# Patient Record
Sex: MALE | Race: OTHER | HISPANIC OR LATINO | URBAN - METROPOLITAN AREA
[De-identification: names, ages, dates, MRNs, and addresses within clinical notes are randomized per-mention and may not be internally consistent; named-entity substitution may affect disease eponyms.]

---

## 2017-04-16 ENCOUNTER — INPATIENT (INPATIENT)
Facility: HOSPITAL | Age: 22
LOS: 1 days | Discharge: HOME | DRG: 917 | End: 2017-04-18
Attending: HOSPITALIST | Admitting: HOSPITALIST
Payer: MEDICAID

## 2017-04-16 VITALS
HEIGHT: 67 IN | HEART RATE: 105 BPM | WEIGHT: 149.91 LBS | DIASTOLIC BLOOD PRESSURE: 78 MMHG | TEMPERATURE: 98 F | OXYGEN SATURATION: 96 % | SYSTOLIC BLOOD PRESSURE: 132 MMHG | RESPIRATION RATE: 18 BRPM

## 2017-04-16 LAB
ALBUMIN SERPL ELPH-MCNC: 4.2 G/DL — SIGNIFICANT CHANGE UP (ref 3.3–5.2)
ALP SERPL-CCNC: 67 U/L — SIGNIFICANT CHANGE UP (ref 40–120)
ALT FLD-CCNC: 36 U/L — SIGNIFICANT CHANGE UP
AMPHET UR-MCNC: NEGATIVE — SIGNIFICANT CHANGE UP
ANION GAP SERPL CALC-SCNC: 22 MMOL/L — HIGH (ref 5–17)
APAP SERPL-MCNC: <7.5 UG/ML — LOW (ref 10–26)
APPEARANCE UR: CLEAR — SIGNIFICANT CHANGE UP
AST SERPL-CCNC: 44 U/L — HIGH
BACTERIA # UR AUTO: ABNORMAL
BARBITURATES UR SCN-MCNC: NEGATIVE — SIGNIFICANT CHANGE UP
BASOPHILS # BLD AUTO: 0 K/UL — SIGNIFICANT CHANGE UP (ref 0–0.2)
BASOPHILS NFR BLD AUTO: 0.2 % — SIGNIFICANT CHANGE UP (ref 0–2)
BENZODIAZ UR-MCNC: POSITIVE
BILIRUB SERPL-MCNC: 0.4 MG/DL — SIGNIFICANT CHANGE UP (ref 0.4–2)
BILIRUB UR-MCNC: NEGATIVE — SIGNIFICANT CHANGE UP
BUN SERPL-MCNC: 10 MG/DL — SIGNIFICANT CHANGE UP (ref 8–20)
CALCIUM SERPL-MCNC: 8.7 MG/DL — SIGNIFICANT CHANGE UP (ref 8.6–10.2)
CHLORIDE SERPL-SCNC: 97 MMOL/L — LOW (ref 98–107)
CO2 SERPL-SCNC: 19 MMOL/L — LOW (ref 22–29)
COCAINE METAB.OTHER UR-MCNC: NEGATIVE — SIGNIFICANT CHANGE UP
COLOR SPEC: YELLOW — SIGNIFICANT CHANGE UP
CREAT SERPL-MCNC: 1.05 MG/DL — SIGNIFICANT CHANGE UP (ref 0.5–1.3)
DIFF PNL FLD: NEGATIVE — SIGNIFICANT CHANGE UP
EOSINOPHIL # BLD AUTO: 0.1 K/UL — SIGNIFICANT CHANGE UP (ref 0–0.5)
EOSINOPHIL NFR BLD AUTO: 1.2 % — SIGNIFICANT CHANGE UP (ref 0–5)
EPI CELLS # UR: SIGNIFICANT CHANGE UP
ETHANOL SERPL-MCNC: <10 MG/DL — SIGNIFICANT CHANGE UP
GAS PNL BLDA: SIGNIFICANT CHANGE UP
GAS PNL BLDA: SIGNIFICANT CHANGE UP
GLUCOSE SERPL-MCNC: 279 MG/DL — HIGH (ref 70–115)
GLUCOSE UR QL: 100 MG/DL
HCT VFR BLD CALC: 45.4 % — SIGNIFICANT CHANGE UP (ref 42–52)
HGB BLD-MCNC: 15.1 G/DL — SIGNIFICANT CHANGE UP (ref 14–18)
KETONES UR-MCNC: NEGATIVE — SIGNIFICANT CHANGE UP
LACTATE BLDV-MCNC: 1.5 MMOL/L — SIGNIFICANT CHANGE UP (ref 0.7–2)
LEUKOCYTE ESTERASE UR-ACNC: NEGATIVE — SIGNIFICANT CHANGE UP
LYMPHOCYTES # BLD AUTO: 3.6 K/UL — SIGNIFICANT CHANGE UP (ref 1–4.8)
LYMPHOCYTES # BLD AUTO: 32.2 % — SIGNIFICANT CHANGE UP (ref 20–55)
MCHC RBC-ENTMCNC: 27.8 PG — SIGNIFICANT CHANGE UP (ref 27–31)
MCHC RBC-ENTMCNC: 33.3 G/DL — SIGNIFICANT CHANGE UP (ref 32–36)
MCV RBC AUTO: 83.5 FL — SIGNIFICANT CHANGE UP (ref 80–94)
METHADONE UR-MCNC: NEGATIVE — SIGNIFICANT CHANGE UP
MONOCYTES # BLD AUTO: 0.7 K/UL — SIGNIFICANT CHANGE UP (ref 0–0.8)
MONOCYTES NFR BLD AUTO: 5.9 % — SIGNIFICANT CHANGE UP (ref 3–10)
NEUTROPHILS # BLD AUTO: 6.7 K/UL — SIGNIFICANT CHANGE UP (ref 1.8–8)
NEUTROPHILS NFR BLD AUTO: 60.1 % — SIGNIFICANT CHANGE UP (ref 37–73)
NITRITE UR-MCNC: NEGATIVE — SIGNIFICANT CHANGE UP
OPIATES UR-MCNC: POSITIVE
PCP SPEC-MCNC: SIGNIFICANT CHANGE UP
PCP UR-MCNC: NEGATIVE — SIGNIFICANT CHANGE UP
PH UR: 6 — SIGNIFICANT CHANGE UP (ref 4.8–8)
PLATELET # BLD AUTO: 259 K/UL — SIGNIFICANT CHANGE UP (ref 150–400)
POTASSIUM SERPL-MCNC: 3.2 MMOL/L — LOW (ref 3.5–5.3)
POTASSIUM SERPL-SCNC: 3.2 MMOL/L — LOW (ref 3.5–5.3)
PROT SERPL-MCNC: 7.9 G/DL — SIGNIFICANT CHANGE UP (ref 6.6–8.7)
PROT UR-MCNC: 15 MG/DL
RBC # BLD: 5.44 M/UL — SIGNIFICANT CHANGE UP (ref 4.6–6.2)
RBC # FLD: 13.5 % — SIGNIFICANT CHANGE UP (ref 11–15.6)
RBC CASTS # UR COMP ASSIST: SIGNIFICANT CHANGE UP /HPF (ref 0–4)
SALICYLATES SERPL-MCNC: <2 MG/DL — LOW (ref 10–20)
SODIUM SERPL-SCNC: 138 MMOL/L — SIGNIFICANT CHANGE UP (ref 135–145)
SP GR SPEC: 1.01 — SIGNIFICANT CHANGE UP (ref 1.01–1.02)
THC UR QL: POSITIVE
UROBILINOGEN FLD QL: NEGATIVE MG/DL — SIGNIFICANT CHANGE UP
WBC # BLD: 11.1 K/UL — HIGH (ref 4.8–10.8)
WBC # FLD AUTO: 11.1 K/UL — HIGH (ref 4.8–10.8)
WBC UR QL: SIGNIFICANT CHANGE UP

## 2017-04-16 PROCEDURE — 99291 CRITICAL CARE FIRST HOUR: CPT

## 2017-04-16 PROCEDURE — 71010: CPT | Mod: 26

## 2017-04-16 RX ORDER — SODIUM CHLORIDE 9 MG/ML
1000 INJECTION INTRAMUSCULAR; INTRAVENOUS; SUBCUTANEOUS ONCE
Qty: 0 | Refills: 0 | Status: COMPLETED | OUTPATIENT
Start: 2017-04-16 | End: 2017-04-16

## 2017-04-16 RX ADMIN — SODIUM CHLORIDE 2000 MILLILITER(S): 9 INJECTION INTRAMUSCULAR; INTRAVENOUS; SUBCUTANEOUS at 19:30

## 2017-04-16 RX ADMIN — Medication 100 MILLIGRAM(S): at 23:08

## 2017-04-16 RX ADMIN — SODIUM CHLORIDE 2000 MILLILITER(S): 9 INJECTION INTRAMUSCULAR; INTRAVENOUS; SUBCUTANEOUS at 22:20

## 2017-04-16 NOTE — ED ADULT TRIAGE NOTE - CHIEF COMPLAINT QUOTE
BIBA after being found unresponsive in house, cyanotic, a RR 4/min. Was given narcan 2mg intranasal and mental and resp status returned. Pt with slurred speech and combative in triage, demanding to go home.

## 2017-04-16 NOTE — ED PROVIDER NOTE - OBJECTIVE STATEMENT
Pt. present to ED for evaluation of drug overdose. Pt. was found by EMS at home with agonal respiration and cyanotic. Pt. was given 2mg of narcan intranasal. Pt. became combative and agitated while in the ED. Pt. at first stated that he did "a couple of bags heroin". Pt. admits to drinking alcohol. Pt. keeps changing his story.

## 2017-04-16 NOTE — ED PROVIDER NOTE - PROGRESS NOTE DETAILS
Pt. re-evaluated due to tachycardia. Pt. is awake and alert and answering questions. Pt. also noted to be hypoxic on room air. Lung sounds has crackles on the left side. CXR shows possible aspiration pneumonia. Pt. placed on 50% Venti mask and now is sating at 90-91%. Blood cx and lactate and blood gas ordered. Pt. admits to doing xanax and heroin and alcohol today. Pt. is on the Venti mask 50%. Blood gas was done on 5L of NC. Pt. now sating 96% on the venti mask. Blood gas will be repeated.

## 2017-04-16 NOTE — ED ADULT NURSE NOTE - OBJECTIVE STATEMENT
as per triage note, pt found cyanotic, unresponsive with agonal breathing earlier today. responded to intranasal narcan. at this time, pt is lethargic, but awake, o x3, terrell, perrl. speech slurred. pt admits to taking "a lot of xanax", alcohol, and "a couple bags of heroin. pt story is different from what was told to dr. hill on his exam. breathing even and unlabored. lungs cta. cap refill brisk. skin w/d/i. + and = peripheral pulses. no le edema. sinus tach on cm. abdomen soft nontender nondistended. pt has no complaints at this time. will continue to monitor. as per triage note, pt found cyanotic, unresponsive with agonal breathing earlier today. responded to intranasal narcan. at this time, pt is lethargic, but awake, o x3, terrell, perrl. speech slurred. pt admits to taking "a lot of xanax", alcohol, and "a couple bags of heroin. pt denies SI, HI. "I was just trying to get high." pt story is different from what was told to dr. hill on his exam. breathing even and unlabored. lungs cta. cap refill brisk. skin w/d/i. + and = peripheral pulses. no le edema. sinus tach on cm. abdomen soft nontender nondistended. pt has no complaints at this time. will continue to monitor.

## 2017-04-17 DIAGNOSIS — F10.20 ALCOHOL DEPENDENCE, UNCOMPLICATED: ICD-10-CM

## 2017-04-17 DIAGNOSIS — F19.10 OTHER PSYCHOACTIVE SUBSTANCE ABUSE, UNCOMPLICATED: ICD-10-CM

## 2017-04-17 DIAGNOSIS — Z98.890 OTHER SPECIFIED POSTPROCEDURAL STATES: Chronic | ICD-10-CM

## 2017-04-17 DIAGNOSIS — J69.0 PNEUMONITIS DUE TO INHALATION OF FOOD AND VOMIT: ICD-10-CM

## 2017-04-17 DIAGNOSIS — F32.9 MAJOR DEPRESSIVE DISORDER, SINGLE EPISODE, UNSPECIFIED: ICD-10-CM

## 2017-04-17 DIAGNOSIS — R56.9 UNSPECIFIED CONVULSIONS: ICD-10-CM

## 2017-04-17 DIAGNOSIS — F12.10 CANNABIS ABUSE, UNCOMPLICATED: ICD-10-CM

## 2017-04-17 DIAGNOSIS — F10.10 ALCOHOL ABUSE, UNCOMPLICATED: ICD-10-CM

## 2017-04-17 DIAGNOSIS — F11.20 OPIOID DEPENDENCE, UNCOMPLICATED: ICD-10-CM

## 2017-04-17 LAB
ANION GAP SERPL CALC-SCNC: 9 MMOL/L — SIGNIFICANT CHANGE UP (ref 5–17)
BASOPHILS # BLD AUTO: 0 K/UL — SIGNIFICANT CHANGE UP (ref 0–0.2)
BASOPHILS NFR BLD AUTO: 0.1 % — SIGNIFICANT CHANGE UP (ref 0–2)
BUN SERPL-MCNC: 6 MG/DL — LOW (ref 8–20)
CALCIUM SERPL-MCNC: 8.8 MG/DL — SIGNIFICANT CHANGE UP (ref 8.6–10.2)
CHLORIDE SERPL-SCNC: 98 MMOL/L — SIGNIFICANT CHANGE UP (ref 98–107)
CO2 SERPL-SCNC: 28 MMOL/L — SIGNIFICANT CHANGE UP (ref 22–29)
CREAT SERPL-MCNC: 0.7 MG/DL — SIGNIFICANT CHANGE UP (ref 0.5–1.3)
GLUCOSE SERPL-MCNC: 108 MG/DL — SIGNIFICANT CHANGE UP (ref 70–115)
HAV IGM SER-ACNC: SIGNIFICANT CHANGE UP
HBA1C BLD-MCNC: 5.2 % — SIGNIFICANT CHANGE UP (ref 4–5.6)
HBV CORE IGM SER-ACNC: SIGNIFICANT CHANGE UP
HBV SURFACE AG SER-ACNC: SIGNIFICANT CHANGE UP
HCT VFR BLD CALC: 40.8 % — LOW (ref 42–52)
HCV AB S/CO SERPL IA: 0.12 S/CO — SIGNIFICANT CHANGE UP
HCV AB SERPL-IMP: SIGNIFICANT CHANGE UP
HGB BLD-MCNC: 13.7 G/DL — LOW (ref 14–18)
LYMPHOCYTES # BLD AUTO: 1.2 K/UL — SIGNIFICANT CHANGE UP (ref 1–4.8)
LYMPHOCYTES # BLD AUTO: 7.1 % — LOW (ref 20–55)
MAGNESIUM SERPL-MCNC: 1.4 MG/DL — LOW (ref 1.8–2.5)
MCHC RBC-ENTMCNC: 27.7 PG — SIGNIFICANT CHANGE UP (ref 27–31)
MCHC RBC-ENTMCNC: 33.6 G/DL — SIGNIFICANT CHANGE UP (ref 32–36)
MCV RBC AUTO: 82.4 FL — SIGNIFICANT CHANGE UP (ref 80–94)
MONOCYTES # BLD AUTO: 0.7 K/UL — SIGNIFICANT CHANGE UP (ref 0–0.8)
MONOCYTES NFR BLD AUTO: 4.2 % — SIGNIFICANT CHANGE UP (ref 3–10)
NEUTROPHILS # BLD AUTO: 15.2 K/UL — HIGH (ref 1.8–8)
NEUTROPHILS NFR BLD AUTO: 88.3 % — HIGH (ref 37–73)
PHOSPHATE SERPL-MCNC: 2.8 MG/DL — SIGNIFICANT CHANGE UP (ref 2.4–4.7)
PLATELET # BLD AUTO: 211 K/UL — SIGNIFICANT CHANGE UP (ref 150–400)
POTASSIUM SERPL-MCNC: 4.2 MMOL/L — SIGNIFICANT CHANGE UP (ref 3.5–5.3)
POTASSIUM SERPL-SCNC: 4.2 MMOL/L — SIGNIFICANT CHANGE UP (ref 3.5–5.3)
RBC # BLD: 4.95 M/UL — SIGNIFICANT CHANGE UP (ref 4.6–6.2)
RBC # FLD: 13.4 % — SIGNIFICANT CHANGE UP (ref 11–15.6)
SODIUM SERPL-SCNC: 135 MMOL/L — SIGNIFICANT CHANGE UP (ref 135–145)
WBC # BLD: 17.3 K/UL — HIGH (ref 4.8–10.8)
WBC # FLD AUTO: 17.3 K/UL — HIGH (ref 4.8–10.8)

## 2017-04-17 PROCEDURE — 99223 1ST HOSP IP/OBS HIGH 75: CPT

## 2017-04-17 PROCEDURE — 93010 ELECTROCARDIOGRAM REPORT: CPT

## 2017-04-17 RX ORDER — AMPICILLIN SODIUM AND SULBACTAM SODIUM 250; 125 MG/ML; MG/ML
1.5 INJECTION, POWDER, FOR SUSPENSION INTRAMUSCULAR; INTRAVENOUS EVERY 6 HOURS
Qty: 0 | Refills: 0 | Status: DISCONTINUED | OUTPATIENT
Start: 2017-04-17 | End: 2017-04-18

## 2017-04-17 RX ORDER — AMPICILLIN SODIUM AND SULBACTAM SODIUM 250; 125 MG/ML; MG/ML
1.5 INJECTION, POWDER, FOR SUSPENSION INTRAMUSCULAR; INTRAVENOUS ONCE
Qty: 0 | Refills: 0 | Status: COMPLETED | OUTPATIENT
Start: 2017-04-17 | End: 2017-04-17

## 2017-04-17 RX ORDER — AMPICILLIN SODIUM AND SULBACTAM SODIUM 250; 125 MG/ML; MG/ML
INJECTION, POWDER, FOR SUSPENSION INTRAMUSCULAR; INTRAVENOUS
Qty: 0 | Refills: 0 | Status: DISCONTINUED | OUTPATIENT
Start: 2017-04-17 | End: 2017-04-18

## 2017-04-17 RX ORDER — HALOPERIDOL DECANOATE 100 MG/ML
5 INJECTION INTRAMUSCULAR EVERY 6 HOURS
Qty: 0 | Refills: 0 | Status: DISCONTINUED | OUTPATIENT
Start: 2017-04-17 | End: 2017-04-18

## 2017-04-17 RX ORDER — ACETAMINOPHEN 500 MG
650 TABLET ORAL EVERY 6 HOURS
Qty: 0 | Refills: 0 | Status: DISCONTINUED | OUTPATIENT
Start: 2017-04-17 | End: 2017-04-18

## 2017-04-17 RX ORDER — MAGNESIUM SULFATE 500 MG/ML
2 VIAL (ML) INJECTION ONCE
Qty: 0 | Refills: 0 | Status: COMPLETED | OUTPATIENT
Start: 2017-04-17 | End: 2017-04-18

## 2017-04-17 RX ORDER — THIAMINE MONONITRATE (VIT B1) 100 MG
100 TABLET ORAL DAILY
Qty: 0 | Refills: 0 | Status: DISCONTINUED | OUTPATIENT
Start: 2017-04-17 | End: 2017-04-18

## 2017-04-17 RX ORDER — POTASSIUM CHLORIDE 20 MEQ
40 PACKET (EA) ORAL ONCE
Qty: 0 | Refills: 0 | Status: COMPLETED | OUTPATIENT
Start: 2017-04-17 | End: 2017-04-17

## 2017-04-17 RX ORDER — ALBUTEROL 90 UG/1
2.5 AEROSOL, METERED ORAL EVERY 4 HOURS
Qty: 0 | Refills: 0 | Status: DISCONTINUED | OUTPATIENT
Start: 2017-04-17 | End: 2017-04-18

## 2017-04-17 RX ORDER — ONDANSETRON 8 MG/1
4 TABLET, FILM COATED ORAL EVERY 6 HOURS
Qty: 0 | Refills: 0 | Status: DISCONTINUED | OUTPATIENT
Start: 2017-04-17 | End: 2017-04-18

## 2017-04-17 RX ORDER — FOLIC ACID 0.8 MG
1 TABLET ORAL DAILY
Qty: 0 | Refills: 0 | Status: DISCONTINUED | OUTPATIENT
Start: 2017-04-17 | End: 2017-04-18

## 2017-04-17 RX ORDER — LACTOBACILLUS ACIDOPHILUS 100MM CELL
1 CAPSULE ORAL
Qty: 0 | Refills: 0 | Status: DISCONTINUED | OUTPATIENT
Start: 2017-04-17 | End: 2017-04-18

## 2017-04-17 RX ORDER — CALCIUM GLUCONATE 100 MG/ML
1 VIAL (ML) INTRAVENOUS ONCE
Qty: 0 | Refills: 0 | Status: COMPLETED | OUTPATIENT
Start: 2017-04-17 | End: 2017-04-17

## 2017-04-17 RX ADMIN — AMPICILLIN SODIUM AND SULBACTAM SODIUM 100 GRAM(S): 250; 125 INJECTION, POWDER, FOR SUSPENSION INTRAMUSCULAR; INTRAVENOUS at 06:25

## 2017-04-17 RX ADMIN — Medication 1 TABLET(S): at 11:48

## 2017-04-17 RX ADMIN — Medication 100 MILLIGRAM(S): at 11:48

## 2017-04-17 RX ADMIN — Medication 40 MILLIEQUIVALENT(S): at 05:17

## 2017-04-17 RX ADMIN — AMPICILLIN SODIUM AND SULBACTAM SODIUM 100 GRAM(S): 250; 125 INJECTION, POWDER, FOR SUSPENSION INTRAMUSCULAR; INTRAVENOUS at 17:57

## 2017-04-17 RX ADMIN — Medication 1 TABLET(S): at 17:58

## 2017-04-17 RX ADMIN — AMPICILLIN SODIUM AND SULBACTAM SODIUM 100 GRAM(S): 250; 125 INJECTION, POWDER, FOR SUSPENSION INTRAMUSCULAR; INTRAVENOUS at 02:17

## 2017-04-17 RX ADMIN — Medication 1 MILLIGRAM(S): at 11:48

## 2017-04-17 RX ADMIN — Medication 200 GRAM(S): at 04:35

## 2017-04-17 RX ADMIN — Medication 2 MILLIGRAM(S): at 13:01

## 2017-04-17 NOTE — H&P ADULT - PMH
ETOH abuse    Polysubstance abuse  Heroin, Marijuana and Xanax  Scoliosis    Seizure  last seizure approx 5 yrs ago

## 2017-04-17 NOTE — PROGRESS NOTE ADULT - SUBJECTIVE AND OBJECTIVE BOX
INTERVAL HPI/OVERNIGHT EVENTS:  HPI:  21 years old male with PMH of Substance Abuse, Alcoholism and Seizure Disorder brought by EMS after he overdosed on drugs. As per mother, he went outside in the afternoon and came back home around 5:30 pm. He went to his room and she checked upon him after few minutes and found him seizing on floor. He was foaming from his mouth. No tongue bite or incontinence. She called EMS who gave him Narcan 2 mg and was transported to [facility] ER.  Patient is AAOx3 and as per him, he has been using Marijuana (5-6 bags since the age of 13 years), Heroin (3-5 bags every 2 weeks for last few months) and Xanax (1 mg x 2-3 tabs per day for 1 month). He also drinks Beer (Coors Light 24 oz 6-10 cans every 2-3 days). He recently has increased using these drugs as he feels depressed -- does not have job and money. Yesterday, he used all of them.  He has a history of Seizure Disorder and he was started on Keppra in 2016  but he is not taking Keppra anymore.   He had an episode of vomiting in ER and is complaining of cough and generalized weakness. Denies any fever, chest pain, shortness of breath, headache or urinary symptoms.  He is willing to got to Rehab. (2017 01:49)    Patient seen and examined.  He is A&O in [facility] communicating freely with his mother at the bedside.  He reports that he is still not feeling well.  He has a headache, is a little nauseous, and feels a little SOB but has not had a recurrent seizure.  He has not had another episode of  vomiting and is requesting a  diet  He passed the bedside screening will be started on a full liquid diet. The plan of care was reviewed with the patient including pending psyche consult for depression and substance abuse rehab referrals.        MEDICATIONS  (STANDING):  lactobacillus acidophilus 1Tablet(s) Oral two times a day with meals  folic acid 1milliGRAM(s) Oral daily  multivitamin 1Tablet(s) Oral daily  thiamine 100milliGRAM(s) Oral daily  ampicillin/sulbactam  IVPB  IV Intermittent   ampicillin/sulbactam  IVPB 1.5Gram(s) IV Intermittent every 6 hours  magnesium sulfate  IVPB 2Gram(s) IV Intermittent once    MEDICATIONS  (PRN):  chlordiazePOXIDE 50milliGRAM(s) Oral every 1 hour PRN CIWA-Ar score 8 or greater  chlordiazePOXIDE 50milliGRAM(s) Oral every 1 hour PRN Symptom-triggered: each CIWA -Ar score 8 or GREATER  acetaminophen   Tablet 650milliGRAM(s) Oral every 6 hours PRN For Temp greater than 38 C (100.4 F)  LORazepam   Injectable 2milliGRAM(s) IV Push every 20 minutes PRN Seizure  ALBUTerol    0.083% 2.5milliGRAM(s) Nebulizer every 4 hours PRN Shortness of Breath and/or Wheezing  ondansetron Injectable 4milliGRAM(s) IV Push every 6 hours PRN Nausea and/or Vomiting      Allergies:  No Known Allergies    Vital Signs Last 24 Hrs  T(C): 37.2, Max: 37.2 ( @ 07:54)  T(F): 99, Max: 99 ( @ 07:54)  HR: 101 (89 - 133)  BP: 111/86 (107/56 - 132/78)  BP(mean): --  RR: 20 (16 - 20)  SpO2: 99% (75% - 100%)    General:   HEENT:  NCAT,  PERRLA, EOMI, Nares Patent, Pharynx Clear, Uvula midline,   Neck: no lymphadenopathy, no JVD,   Lungs: Clear to auscultation with min restricted air movement, no wheezes, no rhonchi, no rales b/l.  CV: RRR,  S1,S2,  no Murmur  ABD: +BS x 4; soft,  nontender, no distension,  No guarding,   MS: FROM throughout.  Muscle tone and strength equal b/l ,  no deformity  EXT:  No cyanosis, no clubbing, no edema b/l    Neuro: A&O x 3; CN II- XII grossly intact.  No focal deficits,  No sensory or motor deficits. (Strength 5/5)     LABS:  ABG - ( 2017 23:19 )  pH: 7.36  /  pCO2: 44    /  pO2: 92    / HCO3: 24    / Base Excess: -0.7  /  SaO2: 98                              13.7   17.3  )-----------( 211      ( 2017 07:06 )             40.8         135  |  98  |  6.0<L>  ----------------------------<  108  4.2   |  28.0  |  0.70    Ca    8.8      2017 07:06  Phos  2.8     -  Mg     1.4         TPro  7.9  /  Alb  4.2  /  TBili  0.4  /  DBili  x   /  AST  44<H>  /  ALT  36  /  AlkPhos  67  -16      Urinalysis Basic - ( 2017 22:58 )    Color: Yellow / Appearance: Clear / S.015 / pH: x  Gluc: x / Ketone: Negative  / Bili: Negative / Urobili: Negative mg/dL   Blood: x / Protein: 15 mg/dL / Nitrite: Negative   Leuk Esterase: Negative / RBC: 0-2 /HPF / WBC 0-2   Sq Epi: x / Non Sq Epi: Occasional / Bacteria: Occasional    CULTURES:  Blood cultures pending    RADIOLOGY & ADDITIONAL TESTS:  CXR:  Patchy opacities b/l lower lobes L>R.  Suspicious for pneumonia

## 2017-04-17 NOTE — H&P ADULT - ASSESSMENT
21 years old male with PMH of Substance Abuse, Alcoholism and Seizure Disorder brought by EMS after he overdosed on drugs.    1) Drug Overdose  - U. Tox is positive for Heroin, Marijuana and Benzodiazepines  - Got a dose of Narcan. AAOx3 now. No respiratory distress.  - Monitor closely.  - EKG  - Social Work Consult - patient is willing to go to Rehab.  - Psychiatry Consult as patient has been feeling depressed.  2) Alcoholism  - [facility] Protocol  -  Consult  3) ? Pneumonia  - Will cover for aspiration pneumonia as patient is high risk for aspiration.  - Will repeat CXR in 48-72 hours. If remains afebrile and no infiltrate on CXR then will D/C antibiotics.  4) Seizure Disorder  - Patient's seizure was likely secondary to Drug Overdose. Will restart Keppra and will consult Neuro Eval if recurrent seizure. If remains seizure free then will recommend outpatient follow up with Neurology.  DVT Prophylaxis -- Early ambulation and VCD boots while in bed.

## 2017-04-17 NOTE — ED BEHAVIORAL HEALTH ASSESSMENT NOTE - RISK ASSESSMENT
Low to moderate: Patient denies any intentional suicidal overdose, he has no h/o suicide attempt, denies symptoms of major depression, juan miguel or pscyhosis, denies any S/H I/I/P, good support, however his impulsivity, and substance use elevate his chronic risk, at this time pt is future oriented and wanting to engage in treatment for substance use.

## 2017-04-17 NOTE — PROGRESS NOTE ADULT - PROBLEM SELECTOR PLAN 1
Patchy opacities B/L lower lobes suspicious for pneumonia.Blood cultures pending.  Continue Unasyn, Albuterol Neb and supplemental O2 PRN.  Bedside screening eval passed.  Will start liquid diet.

## 2017-04-17 NOTE — PROGRESS NOTE ADULT - ASSESSMENT
This 22 yo male with a history of substance, ETOH abuse, and seizure d/o was admitted with inhaled pneumonitis and seizure after an OD of drugs.    The plan of care reviewed with Dr. Tyler Tobin.

## 2017-04-17 NOTE — ED BEHAVIORAL HEALTH ASSESSMENT NOTE - DESCRIPTION
Patient laying in stretcher, he did not appear to be in acute distress. He appeared forthcoming, speech was slurred. UDS positive for benzodiazepines, THC and opioids. h/o of two seizures since 2015 Patient was born in , family is from [facility] [place] [facility].   He graduated [employer]. Studied to be  until "drugs got in the way"

## 2017-04-17 NOTE — PROGRESS NOTE ADULT - PROBLEM SELECTOR PLAN 4
Restart Keppra. Out patient follow up with neurology if no recurrent seizures.  A neuro consult will be called for recurrent seizures.

## 2017-04-17 NOTE — H&P ADULT - NSHPPHYSICALEXAM_GEN_ALL_CORE
Vital Signs   T(C): 36.4, Max: 36.7 (04-16 @ 18:20)  T(F): 97.5, Max: 98 (04-16 @ 18:20)  HR: 110 (105 - 133)  BP: 129/93 (129/93 - 132/78)  RR: 16 (16 - 18)  SpO2: 93% (75% - 96%)  General: Well developed. Well nourished. No acute distress  HEENT: PERRLA. EOMI. Pupils 4 mm bilaterally. Clear conjunctivae. Moist mucus membrane  Neck: Supple. No JVD. No Thyromegaly   Chest: Good air entry bilaterally. Crackles on LLL. No wheezing or rhonchi. No chest wall tenderness.  Heart: Normal S1 & S2 with RRR.   Abdomen: Soft. Non-tender. Non-distended. + BS  Ext: No pedal edema. No calf tenderness. Track marks on elbows, hands and wrists.  Neuro: AAO x 3, No focal deficit, CN II-XII grossly WNL and no speech disorder  Skin: Warm and Dry  Psychiatry: Normal mood and affect

## 2017-04-17 NOTE — ED BEHAVIORAL HEALTH ASSESSMENT NOTE - OTHER PAST PSYCHIATRIC HISTORY (INCLUDE DETAILS REGARDING ONSET, COURSE OF ILLNESS, INPATIENT/OUTPATIENT TREATMENT)
Patient denies any h/o formal psychiatric treatment. Denies trials of psychotropic medications.  Denies any prior suicide attempts.

## 2017-04-17 NOTE — ED BEHAVIORAL HEALTH ASSESSMENT NOTE - TREATMENT
h/o of inpatient rehab for 13 months in 2011 denies h/o inpatient rehab for 13 months, also outpatient treatment at [facility] as above

## 2017-04-17 NOTE — H&P ADULT - NSHPSOCIALHISTORY_GEN_ALL_CORE
Unemployed. Lives with patents.  Smokes Marijuana (5-6 bags since the age of 13 years).  Injects Heroin (3-5 bags every 2 weeks for few months.  Takes Xanax (1 mg x 2-3 tabs/day for 1 month).  Drinks Beers (Coors Light 24 Oz around 6-10 cans every 2-3 days)

## 2017-04-17 NOTE — ED BEHAVIORAL HEALTH ASSESSMENT NOTE - HPI (INCLUDE ILLNESS QUALITY, SEVERITY, DURATION, TIMING, CONTEXT, MODIFYING FACTORS, ASSOCIATED SIGNS AND SYMPTOMS)
Patient is a 21  year old, male; domicile with parents who are from [facility]; single; noncaregiver; unemployed, no significant PPH, no prior hospitalizations; no known suicide attempts; no known history of violence or arrests; h/o of heroine, ETOH, cannabis and marijuana use ; PMH of seizures ; brought in by EMS; called by mother  ; presenting with non responsiveness in context of drug overdose.          Patient was  BIBA after being found unresponsive in house, cyanotic, a RR 4/min. Was given narcan 2mg intranasal and mental and resp status returned. Pt with slurred speech and combative in triage, intially demanding to go home. On interview, pt reported that he has been "a little depressed'. He states he has no money and no work.  He has been on a binge for the last week on alcohol and benzodiazepines.  He reports that he has been drinking 5 24 onz beers daily and 3 sticks of Xanax a day.  He states that he took 1 bag or 2 of heroine yesterday and that was the last thing he remember. He adamantly denies any desire  to hurt himself or end his life.  Patient states "I would never try to do that". Patient states he feels terrible about what happened and did not expect it. He conjectures that maybe its because he was taking alcohol and  benzo's on top of the heroine. He reports sporadic heroine use every month or two.        Patient states that he has been a litte depressed. He feels that he uses drugs to distract himself. He also reports that he has been spending a lot of time playing video games.  He reports some difficutly with sleep, but denies any disturbance in appetite, or energy.    Patient reports that he wants help for his drug use, and reports interest in inpatient rehab.  He also reports non specific anxiety.      Concerning other psychiatric symptoms Patient denies aggressive or violent behavior towards others. Pt denies any episodes of bizarre happiness, unusual energy, unusual nightime excitation or other common symptoms of juan miguel.  Denies hearing voices or seeing things.  No delusions were elicited.  Patient denies  panic attacks, obsessions or compulsions.      Father reported that he thought patient was dead.  Neither mother or father feel that this was an intention suicide attempt.  They feel he spends time with the wrong crowd of people.  Mother was the one who called 911.  Both parents and patient are requesting treatment for substance use.

## 2017-04-17 NOTE — ED BEHAVIORAL HEALTH ASSESSMENT NOTE - DESCRIPTION (FIRST USE, LAST USE, QUANTITY, FREQUENCY, DURATION)
reports he smoked in the past Drinking since age 13 reports daily use since HS Reports sporadic use every month or two since age 16. States he last probably used a bag or two yesterday. reports on an off , states last used 3 sticks of Xanax yesterday and binged for a week

## 2017-04-17 NOTE — H&P ADULT - NSHPLABSRESULTS_GEN_ALL_CORE
LABS:                        15.1   11.1  )-----------( 259      ( 16 Apr 2017 18:44 )             45.4     04-16    138  |  97<L>  |  10.0  ----------------------------<  279<H>  3.2<L>   |  19.0<L>  |  1.05    Ca    8.7      16 Apr 2017 18:44    TPro  7.9  /  Alb  4.2  /  TBili  0.4  /  DBili  x   /  AST  44<H>  /  ALT  36  /  AlkPhos  67  04-16

## 2017-04-17 NOTE — ED BEHAVIORAL HEALTH ASSESSMENT NOTE - SUMMARY
Patient is a 21  year old, male; domicile with parents who are from [facility]; single; noncaregiver; unemployed, no significant PPH, no prior hospitalizations; no known suicide attempts; no known history of violence or arrests; h/o of heroine, ETOH, cannabis and marijuana use ; PMH of seizures ; brought in by EMS; called by mother  ; presenting with non responsiveness in context of drug overdose.  Patient appears to have overdose accidentally. There is no evidence that this was a intentional overdose.  Patient denies symptoms of major depression, juan miguel or psychosis.  Denies any S/H I/I/P.  He feels "terrible" that he could have almost killed himself, and reports that he wants treatment.  Discussed options with pt and family.  Patient reports that he would like to an inpatient rehab.  There is no current need for inpatient psychiatric hospitalization.

## 2017-04-17 NOTE — ED BEHAVIORAL HEALTH ASSESSMENT NOTE - DETAILS
reports he feels tired, spitting blood at times, does not appear to be in physical distrss spoke with mother

## 2017-04-17 NOTE — ED BEHAVIORAL HEALTH ASSESSMENT NOTE - REFERRAL / APPOINTMENT DETAILS
No need for psychiatric inpatient.  Would refer to inpatient rehab. Discussed with family, patient and

## 2017-04-17 NOTE — H&P ADULT - HISTORY OF PRESENT ILLNESS
21 years old male with PMH of Substance Abuse, Alcoholism and Seizure Disorder brought by EMS after he overdosed on drugs. As per mother, he went outside in the afternoon and came back home around 5:30 pm. He went to his room and she checked upon him after few minutes and found him seizing on floor. He was foaming from his mouth. No tongue bite or incontinence. She called EMS who gave him Narcan 2 mg and was transported to [facility] ER.  Patient is AAOx3 and as per him, he has been using Marijuana (5-6 bags since the age of 13 years), Heroin (3-5 bags every 2 weeks for last few months) and Xanax (1 mg x 2-3 tabs per day for 1 month). He also drinks Beer (Coors Light 24 oz 6-10 cans every 2-3 days). He recently has increased using these drugs as he feels depressed -- does not have job and money. Yesterday, he used all of them.  He has a history of Seizure Disorder and he was started on Keppra in January 2016  but he is not taking Keppra anymore.   He had an episode of vomiting in ER and is complaining of cough and generalized weakness. Denies any fever, chest pain, shortness of breath, headache or urinary symptoms.  He is willing to got to Rehab.

## 2017-04-18 ENCOUNTER — TRANSCRIPTION ENCOUNTER (OUTPATIENT)
Age: 22
End: 2017-04-18

## 2017-04-18 VITALS
DIASTOLIC BLOOD PRESSURE: 88 MMHG | TEMPERATURE: 98 F | OXYGEN SATURATION: 96 % | HEART RATE: 88 BPM | SYSTOLIC BLOOD PRESSURE: 122 MMHG | RESPIRATION RATE: 16 BRPM

## 2017-04-18 LAB
ANION GAP SERPL CALC-SCNC: 14 MMOL/L — SIGNIFICANT CHANGE UP (ref 5–17)
BUN SERPL-MCNC: 7 MG/DL — LOW (ref 8–20)
CALCIUM SERPL-MCNC: 9.2 MG/DL — SIGNIFICANT CHANGE UP (ref 8.6–10.2)
CHLORIDE SERPL-SCNC: 95 MMOL/L — LOW (ref 98–107)
CO2 SERPL-SCNC: 26 MMOL/L — SIGNIFICANT CHANGE UP (ref 22–29)
CREAT SERPL-MCNC: 0.65 MG/DL — SIGNIFICANT CHANGE UP (ref 0.5–1.3)
GLUCOSE SERPL-MCNC: 98 MG/DL — SIGNIFICANT CHANGE UP (ref 70–115)
HCT VFR BLD CALC: 43.2 % — SIGNIFICANT CHANGE UP (ref 42–52)
HGB BLD-MCNC: 14.6 G/DL — SIGNIFICANT CHANGE UP (ref 14–18)
MAGNESIUM SERPL-MCNC: 2.4 MG/DL — SIGNIFICANT CHANGE UP (ref 1.8–2.5)
MCHC RBC-ENTMCNC: 27.3 PG — SIGNIFICANT CHANGE UP (ref 27–31)
MCHC RBC-ENTMCNC: 33.8 G/DL — SIGNIFICANT CHANGE UP (ref 32–36)
MCV RBC AUTO: 80.7 FL — SIGNIFICANT CHANGE UP (ref 80–94)
PLATELET # BLD AUTO: 222 K/UL — SIGNIFICANT CHANGE UP (ref 150–400)
POTASSIUM SERPL-MCNC: 3.9 MMOL/L — SIGNIFICANT CHANGE UP (ref 3.5–5.3)
POTASSIUM SERPL-SCNC: 3.9 MMOL/L — SIGNIFICANT CHANGE UP (ref 3.5–5.3)
RBC # BLD: 5.35 M/UL — SIGNIFICANT CHANGE UP (ref 4.6–6.2)
RBC # FLD: 13.5 % — SIGNIFICANT CHANGE UP (ref 11–15.6)
SODIUM SERPL-SCNC: 135 MMOL/L — SIGNIFICANT CHANGE UP (ref 135–145)
WBC # BLD: 7.1 K/UL — SIGNIFICANT CHANGE UP (ref 4.8–10.8)
WBC # FLD AUTO: 7.1 K/UL — SIGNIFICANT CHANGE UP (ref 4.8–10.8)

## 2017-04-18 PROCEDURE — 80048 BASIC METABOLIC PNL TOTAL CA: CPT

## 2017-04-18 PROCEDURE — 82330 ASSAY OF CALCIUM: CPT

## 2017-04-18 PROCEDURE — 83735 ASSAY OF MAGNESIUM: CPT

## 2017-04-18 PROCEDURE — 36600 WITHDRAWAL OF ARTERIAL BLOOD: CPT

## 2017-04-18 PROCEDURE — 80053 COMPREHEN METABOLIC PANEL: CPT

## 2017-04-18 PROCEDURE — 87040 BLOOD CULTURE FOR BACTERIA: CPT

## 2017-04-18 PROCEDURE — 83605 ASSAY OF LACTIC ACID: CPT

## 2017-04-18 PROCEDURE — 82435 ASSAY OF BLOOD CHLORIDE: CPT

## 2017-04-18 PROCEDURE — 71045 X-RAY EXAM CHEST 1 VIEW: CPT

## 2017-04-18 PROCEDURE — 85014 HEMATOCRIT: CPT

## 2017-04-18 PROCEDURE — 82947 ASSAY GLUCOSE BLOOD QUANT: CPT

## 2017-04-18 PROCEDURE — 81001 URINALYSIS AUTO W/SCOPE: CPT

## 2017-04-18 PROCEDURE — 84100 ASSAY OF PHOSPHORUS: CPT

## 2017-04-18 PROCEDURE — 84295 ASSAY OF SERUM SODIUM: CPT

## 2017-04-18 PROCEDURE — 83036 HEMOGLOBIN GLYCOSYLATED A1C: CPT

## 2017-04-18 PROCEDURE — 80074 ACUTE HEPATITIS PANEL: CPT

## 2017-04-18 PROCEDURE — 93005 ELECTROCARDIOGRAM TRACING: CPT

## 2017-04-18 PROCEDURE — 99239 HOSP IP/OBS DSCHRG MGMT >30: CPT

## 2017-04-18 PROCEDURE — 36415 COLL VENOUS BLD VENIPUNCTURE: CPT

## 2017-04-18 PROCEDURE — 82803 BLOOD GASES ANY COMBINATION: CPT

## 2017-04-18 PROCEDURE — 99291 CRITICAL CARE FIRST HOUR: CPT | Mod: 25

## 2017-04-18 PROCEDURE — 96374 THER/PROPH/DIAG INJ IV PUSH: CPT

## 2017-04-18 PROCEDURE — 84132 ASSAY OF SERUM POTASSIUM: CPT

## 2017-04-18 PROCEDURE — T1013: CPT

## 2017-04-18 PROCEDURE — 85027 COMPLETE CBC AUTOMATED: CPT

## 2017-04-18 PROCEDURE — 94760 N-INVAS EAR/PLS OXIMETRY 1: CPT

## 2017-04-18 PROCEDURE — 80307 DRUG TEST PRSMV CHEM ANLYZR: CPT

## 2017-04-18 RX ORDER — THIAMINE MONONITRATE (VIT B1) 100 MG
1 TABLET ORAL
Qty: 0 | Refills: 0 | COMMUNITY
Start: 2017-04-18

## 2017-04-18 RX ORDER — CIPROFLOXACIN LACTATE 400MG/40ML
1 VIAL (ML) INTRAVENOUS
Qty: 7 | Refills: 0 | OUTPATIENT
Start: 2017-04-18 | End: 2017-04-25

## 2017-04-18 RX ORDER — THIAMINE MONONITRATE (VIT B1) 100 MG
1 TABLET ORAL
Qty: 30 | Refills: 0 | OUTPATIENT
Start: 2017-04-18 | End: 2017-05-18

## 2017-04-18 RX ADMIN — Medication 50 GRAM(S): at 00:08

## 2017-04-18 RX ADMIN — AMPICILLIN SODIUM AND SULBACTAM SODIUM 100 GRAM(S): 250; 125 INJECTION, POWDER, FOR SUSPENSION INTRAMUSCULAR; INTRAVENOUS at 00:31

## 2017-04-18 RX ADMIN — AMPICILLIN SODIUM AND SULBACTAM SODIUM 100 GRAM(S): 250; 125 INJECTION, POWDER, FOR SUSPENSION INTRAMUSCULAR; INTRAVENOUS at 05:29

## 2017-04-18 NOTE — DISCHARGE NOTE ADULT - PLAN OF CARE
advised to quit not withdrawing dc with thiamine dc with abx stable  no wbc count or fever cleared by psych no HI or SI home keppra

## 2017-04-18 NOTE — DISCHARGE NOTE ADULT - ADDITIONAL INSTRUCTIONS
Follow up for phone screening to schedule an appointment with  [facility]  Address: [address] [place] [address] [address], [place] [address]  Phone: [phone]

## 2017-04-18 NOTE — DISCHARGE NOTE ADULT - CARE PROVIDER_API CALL
lilly elaine  Phone: (   )    -  Fax: (   )    -    Luke Moyer), Neurology  [address] [place] [address]  Phone: [phone]  Fax: [phone]

## 2017-04-18 NOTE — DISCHARGE NOTE ADULT - PATIENT PORTAL LINK FT
“You can access the FollowHealth Patient Portal, offered by [facility], by registering with the following website: [email or web address]”

## 2017-04-18 NOTE — DISCHARGE NOTE ADULT - MEDICATION SUMMARY - MEDICATIONS TO TAKE
I will START or STAY ON the medications listed below when I get home from the hospital:    levoFLOXacin 750 mg oral tablet  -- 1 tab(s) by mouth every 24 hours  -- Indication: For Pneumonia    thiamine 100 mg oral tablet  -- 1 tab(s) by mouth once a day  -- Indication: For vitamins

## 2017-04-18 NOTE — DISCHARGE NOTE ADULT - PROVIDER TOKENS
FREE:[LAST:[elaine],PHONE:[(   )    -],FAX:[(   )    -],ADDRESS:[[facility]]],TOKEN:'[id number]'

## 2017-04-18 NOTE — DISCHARGE NOTE ADULT - HOSPITAL COURSE
21 years old male with PMH of Substance Abuse, Alcoholism and Seizure Disorder brought by EMS after he overdosed on drugs. He was foaming from his mouth. No tongue bite or incontinence. She called EMS who gave him Narcan 2 mg and was transported to [facility] ER. Patient is AAOx3 and as per him, he has been using Marijuana (5-6 bags since the age of 13 years), Heroin (3-5 bags every 2 weeks for last few months) and Xanax (1 mg x 2-3 tabs per day for 1 month). He also drinks Beer (Coors Light 24 oz 6-10 cans every 2-3 days). He recently has increased using these drugs as he feels depressed -- does not have job and money.   He has a history of Seizure Disorder and he was started on Keppra in January 2016  but he is not taking Keppra anymore.     patient admitted to medicine and u tox was positive for benzo, thc and opiates. Patient placed on ciwa and seen by psych. cleared for si or hi. Patient had chest xray which showed asp pneumonmia and started on abx. patients wbc improved and is stable for dc home. social work provided phone numbers for outpatient follow up for drugs.     time spent on dc 35 minutes

## 2017-04-18 NOTE — DISCHARGE NOTE ADULT - CARE PLAN
Principal Discharge DX:	Polysubstance abuse  Goal:	advised to quit  Instructions for follow-up, activity and diet:	not withdrawing  Secondary Diagnosis:	ETOH abuse  Goal:	advised to quit  Instructions for follow-up, activity and diet:	dc with thiamine  Secondary Diagnosis:	Aspiration pneumonia  Goal:	dc with abx  Instructions for follow-up, activity and diet:	stable  no wbc count or fever  Secondary Diagnosis:	Depression  Goal:	cleared by psych  Instructions for follow-up, activity and diet:	no HI or SI  Secondary Diagnosis:	Seizure  Goal:	home keppra

## 2017-04-19 NOTE — ED ADULT NURSE REASSESSMENT NOTE - NS ED NURSE REASSESS COMMENT FT1
patient returned this morning with photo id stated he lost his discharge papers from yesterday and was unsuccessful at finding his medications at the pharmacy. Pt given copy of discharge papers and informed on where to locate his prescriptions copy of prescription orders given to patient and to where go. patient resigned discharge instructions and scanned into vaishnavi.
pt hr and oxygensaturation have improved with venti mask anf 2n bolus of fluids.
pt tachycardiac and hypoxic. dr. hill called to bedside. pt breathing even and unlabored. lethargic but o x3. minor ronchi at bases as per dr. hill. stat cxr ordered. will continue to monitor.
report given to raul fernandez rn.

## 2017-04-21 LAB
CULTURE RESULTS: SIGNIFICANT CHANGE UP
SPECIMEN SOURCE: SIGNIFICANT CHANGE UP

## 2017-04-22 LAB
CULTURE RESULTS: SIGNIFICANT CHANGE UP
SPECIMEN SOURCE: SIGNIFICANT CHANGE UP

## 2020-01-02 PROBLEM — M41.9 SCOLIOSIS, UNSPECIFIED: Chronic | Status: ACTIVE | Noted: 2017-04-16

## 2020-01-02 PROBLEM — F19.10 OTHER PSYCHOACTIVE SUBSTANCE ABUSE, UNCOMPLICATED: Chronic | Status: ACTIVE | Noted: 2017-04-16

## 2020-01-14 ENCOUNTER — APPOINTMENT (OUTPATIENT)
Dept: DERMATOLOGY | Facility: CLINIC | Age: 25
End: 2020-01-14
Payer: MEDICAID

## 2020-01-14 PROCEDURE — 11900 INJECT SKIN LESIONS </W 7: CPT

## 2020-01-14 PROCEDURE — 99202 OFFICE O/P NEW SF 15 MIN: CPT | Mod: 25

## 2020-03-12 ENCOUNTER — APPOINTMENT (OUTPATIENT)
Dept: DERMATOLOGY | Facility: CLINIC | Age: 25
End: 2020-03-12

## 2020-12-26 ENCOUNTER — OUTPATIENT (OUTPATIENT)
Dept: OUTPATIENT SERVICES | Facility: HOSPITAL | Age: 25
LOS: 1 days | End: 2020-12-26
Payer: MEDICAID

## 2020-12-26 DIAGNOSIS — Z98.890 OTHER SPECIFIED POSTPROCEDURAL STATES: Chronic | ICD-10-CM

## 2020-12-26 DIAGNOSIS — Z20.828 CONTACT WITH AND (SUSPECTED) EXPOSURE TO OTHER VIRAL COMMUNICABLE DISEASES: ICD-10-CM

## 2020-12-26 LAB — SARS-COV-2 RNA SPEC QL NAA+PROBE: SIGNIFICANT CHANGE UP

## 2020-12-26 PROCEDURE — U0003: CPT

## 2021-05-17 ENCOUNTER — APPOINTMENT (OUTPATIENT)
Dept: GASTROENTEROLOGY | Facility: CLINIC | Age: 26
End: 2021-05-17
Payer: MEDICAID

## 2021-05-17 VITALS
BODY MASS INDEX: 28.25 KG/M2 | SYSTOLIC BLOOD PRESSURE: 121 MMHG | TEMPERATURE: 97.5 F | HEIGHT: 67 IN | WEIGHT: 180 LBS | DIASTOLIC BLOOD PRESSURE: 66 MMHG | HEART RATE: 75 BPM

## 2021-05-17 DIAGNOSIS — R10.33 PERIUMBILICAL PAIN: ICD-10-CM

## 2021-05-17 DIAGNOSIS — R10.9 UNSPECIFIED ABDOMINAL PAIN: ICD-10-CM

## 2021-05-17 PROCEDURE — 99072 ADDL SUPL MATRL&STAF TM PHE: CPT

## 2021-05-17 PROCEDURE — 99204 OFFICE O/P NEW MOD 45 MIN: CPT

## 2021-05-17 PROCEDURE — 82272 OCCULT BLD FECES 1-3 TESTS: CPT

## 2021-05-17 RX ORDER — ONDANSETRON 4 MG/1
4 TABLET ORAL
Qty: 25 | Refills: 0 | Status: DISCONTINUED | COMMUNITY
Start: 2021-01-18

## 2021-05-17 RX ORDER — FLUTICASONE PROPIONATE 50 UG/1
50 SPRAY, METERED NASAL
Qty: 16 | Refills: 0 | Status: DISCONTINUED | COMMUNITY
Start: 2021-04-20

## 2021-05-17 RX ORDER — LOPERAMIDE HYDROCHLORIDE 2 MG/1
2 CAPSULE ORAL
Qty: 25 | Refills: 0 | Status: DISCONTINUED | COMMUNITY
Start: 2021-01-18

## 2021-05-17 RX ORDER — OMEPRAZOLE 40 MG/1
40 CAPSULE, DELAYED RELEASE ORAL
Qty: 30 | Refills: 0 | Status: DISCONTINUED | COMMUNITY
Start: 2021-02-25

## 2021-05-17 NOTE — HISTORY OF PRESENT ILLNESS
[FreeTextEntry1] : 25-year-old  male with prior history of IV drug use and alcohol abuse alleges that he has been in remission from both of above-mentioned issues for at least a year.  Is now experiencing recurrent bouts of upper abdominal pain mostly epigastric but radiates to both right upper and left upper quadrants.  They are episodic in nature and may last up to several hours in duration.  There are no obvious triggers.  Not clearly related to the ingestion of any type of food.  Not associated with nausea or vomiting or fever.  And does radiate through to the back.  There is no prior history of peptic ulcer disease or pancreatitis or abdominal trauma.  Has not been any weight loss fever or jaundice.  Worse with intake of onions.  Notes some heartburn and regurgitation.  Movements remain normal.  No overt bleeding.  No rectal bleeding hematochezia diarrhea or constipation.  No ER evaluation.  No prior blood work.  No abdominal trauma.  Prior history of pancreatitis liver disease or gallbladder disease.

## 2021-05-17 NOTE — ASSESSMENT
[FreeTextEntry1] : Recurrent bouts of upper abdominal pain about twice weekly for the past 10 weeks.  No history of pancreatitis or gallbladder disease.  No history of peptic ulcer disease.  Symptoms do not allow us to focus any better.  Nontoxic-appearing.  No history of hepatitis.  Advised blood work to include CBC, CMP, coagulation profiles, amylase, lipase and urea breath test to assess for H. pylori.  CAT scan abdomen pelvis with oral and IV contrast to be arranged via [facility] in the near future.  I office follow-up thereafter or in 1 month.  No treatment to date.  ER evaluation if symptoms become considerably worse.

## 2021-05-17 NOTE — REASON FOR VISIT
[Consultation] : a consultation visit [FreeTextEntry1] : Upper abdominal pain bloating times several months.

## 2021-05-17 NOTE — PHYSICAL EXAM
[General Appearance - Alert] : alert [General Appearance - In No Acute Distress] : in no acute distress [Outer Ear] : the ears and nose were normal in appearance [Oropharynx] : the oropharynx was normal [Neck Appearance] : the appearance of the neck was normal [Neck Cervical Mass (___cm)] : no neck mass was observed [Jugular Venous Distention Increased] : there was no jugular-venous distention [Thyroid Diffuse Enlargement] : the thyroid was not enlarged [Thyroid Nodule] : there were no palpable thyroid nodules [Auscultation Breath Sounds / Voice Sounds] : lungs were clear to auscultation bilaterally [Heart Rate And Rhythm] : heart rate was normal and rhythm regular [Heart Sounds] : normal S1 and S2 [Heart Sounds Gallop] : no gallops [Murmurs] : no murmurs [Heart Sounds Pericardial Friction Rub] : no pericardial rub [Bowel Sounds] : normal bowel sounds [Abdomen Soft] : soft [Abdomen Tenderness] : non-tender [] : no hepato-splenomegaly [Abdomen Mass (___ Cm)] : no abdominal mass palpated [Normal Sphincter Tone] : normal sphincter tone [No Rectal Mass] : no rectal mass [Internal Hemorrhoid] : no internal hemorrhoids [External Hemorrhoid] : no external hemorrhoids [Occult Blood Positive] : stool was negative for occult blood [FreeTextEntry1] : Normal tone.  No lesions.  No hemorrhoids.  Small amount of brown stool in rectal vault.  No prostate tenderness.  No blood.  Occult blood negative.

## 2021-05-23 LAB
ALBUMIN SERPL ELPH-MCNC: 4.6 G/DL
ALP BLD-CCNC: 51 U/L
ALT SERPL-CCNC: 28 U/L
AMYLASE/CREAT SERPL: 52 U/L
ANION GAP SERPL CALC-SCNC: 12 MMOL/L
AST SERPL-CCNC: 26 U/L
BASOPHILS # BLD AUTO: 0.04 K/UL
BASOPHILS NFR BLD AUTO: 0.7 %
BILIRUB SERPL-MCNC: 0.4 MG/DL
BUN SERPL-MCNC: 16 MG/DL
CALCIUM SERPL-MCNC: 9.4 MG/DL
CHLORIDE SERPL-SCNC: 107 MMOL/L
CO2 SERPL-SCNC: 22 MMOL/L
CREAT SERPL-MCNC: 0.62 MG/DL
EOSINOPHIL # BLD AUTO: 0.16 K/UL
EOSINOPHIL NFR BLD AUTO: 2.8 %
GLUCOSE SERPL-MCNC: 95 MG/DL
HCT VFR BLD CALC: 44.4 %
HGB BLD-MCNC: 14.3 G/DL
IMM GRANULOCYTES NFR BLD AUTO: 0.2 %
INR PPP: 1.06 RATIO
LPL SERPL-CCNC: 43 U/L
LYMPHOCYTES # BLD AUTO: 2.12 K/UL
LYMPHOCYTES NFR BLD AUTO: 37.1 %
MAN DIFF?: NORMAL
MCHC RBC-ENTMCNC: 27.2 PG
MCHC RBC-ENTMCNC: 32.2 GM/DL
MCV RBC AUTO: 84.4 FL
MONOCYTES # BLD AUTO: 0.7 K/UL
MONOCYTES NFR BLD AUTO: 12.2 %
NEUTROPHILS # BLD AUTO: 2.69 K/UL
NEUTROPHILS NFR BLD AUTO: 47 %
PLATELET # BLD AUTO: 259 K/UL
POTASSIUM SERPL-SCNC: 4.1 MMOL/L
PROT SERPL-MCNC: 7.2 G/DL
PT BLD: 12.6 SEC
RBC # BLD: 5.26 M/UL
RBC # FLD: 14.2 %
SODIUM SERPL-SCNC: 140 MMOL/L
UREA BREATH TEST QL: POSITIVE
WBC # FLD AUTO: 5.72 K/UL

## 2021-05-24 ENCOUNTER — NON-APPOINTMENT (OUTPATIENT)
Age: 26
End: 2021-05-24

## 2021-06-04 ENCOUNTER — EMERGENCY (EMERGENCY)
Facility: HOSPITAL | Age: 26
LOS: 1 days | Discharge: HOME | End: 2021-06-04
Attending: EMERGENCY MEDICINE
Payer: COMMERCIAL

## 2021-06-04 VITALS
HEART RATE: 97 BPM | DIASTOLIC BLOOD PRESSURE: 83 MMHG | OXYGEN SATURATION: 97 % | HEIGHT: 67 IN | TEMPERATURE: 98 F | SYSTOLIC BLOOD PRESSURE: 148 MMHG | RESPIRATION RATE: 18 BRPM

## 2021-06-04 DIAGNOSIS — Z98.890 OTHER SPECIFIED POSTPROCEDURAL STATES: Chronic | ICD-10-CM

## 2021-06-04 PROCEDURE — 90715 TDAP VACCINE 7 YRS/> IM: CPT

## 2021-06-04 PROCEDURE — 90471 IMMUNIZATION ADMIN: CPT

## 2021-06-04 PROCEDURE — 99284 EMERGENCY DEPT VISIT MOD MDM: CPT

## 2021-06-04 PROCEDURE — 99053 MED SERV 10PM-8AM 24 HR FAC: CPT

## 2021-06-04 PROCEDURE — 99284 EMERGENCY DEPT VISIT MOD MDM: CPT | Mod: 25

## 2021-06-04 RX ORDER — IBUPROFEN 200 MG
1 TABLET ORAL
Qty: 28 | Refills: 0
Start: 2021-06-04 | End: 2021-06-10

## 2021-06-04 RX ORDER — TETANUS TOXOID, REDUCED DIPHTHERIA TOXOID AND ACELLULAR PERTUSSIS VACCINE, ADSORBED 5; 2.5; 8; 8; 2.5 [IU]/.5ML; [IU]/.5ML; UG/.5ML; UG/.5ML; UG/.5ML
0.5 SUSPENSION INTRAMUSCULAR ONCE
Refills: 0 | Status: COMPLETED | OUTPATIENT
Start: 2021-06-04 | End: 2021-06-04

## 2021-06-04 RX ORDER — METHOCARBAMOL 500 MG/1
1500 TABLET, FILM COATED ORAL ONCE
Refills: 0 | Status: COMPLETED | OUTPATIENT
Start: 2021-06-04 | End: 2021-06-04

## 2021-06-04 RX ORDER — IBUPROFEN 200 MG
600 TABLET ORAL ONCE
Refills: 0 | Status: COMPLETED | OUTPATIENT
Start: 2021-06-04 | End: 2021-06-04

## 2021-06-04 RX ORDER — METHOCARBAMOL 500 MG/1
2 TABLET, FILM COATED ORAL
Qty: 18 | Refills: 0
Start: 2021-06-04 | End: 2021-06-06

## 2021-06-04 RX ORDER — ACETAMINOPHEN 500 MG
650 TABLET ORAL ONCE
Refills: 0 | Status: COMPLETED | OUTPATIENT
Start: 2021-06-04 | End: 2021-06-04

## 2021-06-04 RX ORDER — LIDOCAINE 4 G/100G
1 CREAM TOPICAL ONCE
Refills: 0 | Status: COMPLETED | OUTPATIENT
Start: 2021-06-04 | End: 2021-06-04

## 2021-06-04 RX ORDER — LIDOCAINE 4 G/100G
1 CREAM TOPICAL
Qty: 7 | Refills: 0
Start: 2021-06-04 | End: 2021-06-10

## 2021-06-04 RX ADMIN — TETANUS TOXOID, REDUCED DIPHTHERIA TOXOID AND ACELLULAR PERTUSSIS VACCINE, ADSORBED 0.5 MILLILITER(S): 5; 2.5; 8; 8; 2.5 SUSPENSION INTRAMUSCULAR at 08:00

## 2021-06-04 RX ADMIN — METHOCARBAMOL 1500 MILLIGRAM(S): 500 TABLET, FILM COATED ORAL at 07:59

## 2021-06-04 RX ADMIN — Medication 650 MILLIGRAM(S): at 07:59

## 2021-06-04 RX ADMIN — Medication 650 MILLIGRAM(S): at 08:02

## 2021-06-04 RX ADMIN — LIDOCAINE 1 PATCH: 4 CREAM TOPICAL at 08:00

## 2021-06-04 RX ADMIN — Medication 600 MILLIGRAM(S): at 08:02

## 2021-06-04 RX ADMIN — Medication 600 MILLIGRAM(S): at 07:59
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
## 2021-06-04 NOTE — ED PROVIDER NOTE - PHYSICAL EXAMINATION
Gen: Well appearing in NAD  Head: NC. 3 cm laceration to the left lateral brow no exposed bone noted + ttp over the left lateral brow   EYES REDDY EOMI   ears no hemotympanum   Neck: trachea midline  heart: RRR   Resp:  No distress  Ext: no deformities. FROM of all extremities   spine no midline pt vertebral tenderness   Neuro:  A&O appears non focal  Skin:  Warm and dry as visualized  Psych:  Normal affect and mood
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
## 2021-06-04 NOTE — ED ADULT NURSE NOTE - PAIN: PRESENCE, MLM
Continued Stay Note  [facility]     Patient Name: John Varma  MRN: [id number]  Today's Date: 5/5/2020    Admit Date: 4/23/2020    Discharge Plan     Row Name 05/05/20 1138       Plan    Plan  Update    Plan Comments  P.GAETANO. has recommended inpt rehab. Spoke with patient father via phone he was wanting referrals made to [facility] and [facility] referrals made. CM will follow.    Final Discharge Disposition Code  03 - skilled nursing facility (SNF)        Discharge Codes    No documentation.       Expected Discharge Date and Time     Expected Discharge Date Expected Discharge Time    May 4, 2020             Dory Jane RN     complains of pain/discomfort

## 2021-06-04 NOTE — ED PROVIDER NOTE - PATIENT PORTAL LINK FT
You can access the FollowMyHealth Patient Portal offered by [facility] by registering at the following website: [email or web address]. By joining [employer]s FollowMyHealth portal, you will also be able to view your health information using other applications (apps) compatible with our system.
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
## 2021-06-12 ENCOUNTER — EMERGENCY (EMERGENCY)
Facility: HOSPITAL | Age: 26
LOS: 1 days | Discharge: HOME | End: 2021-06-12
Attending: EMERGENCY MEDICINE | Admitting: EMERGENCY MEDICINE
Payer: COMMERCIAL

## 2021-06-12 VITALS
SYSTOLIC BLOOD PRESSURE: 117 MMHG | OXYGEN SATURATION: 98 % | HEIGHT: 67 IN | WEIGHT: 169.98 LBS | TEMPERATURE: 98 F | DIASTOLIC BLOOD PRESSURE: 78 MMHG | RESPIRATION RATE: 20 BRPM | HEART RATE: 88 BPM

## 2021-06-12 DIAGNOSIS — Z98.890 OTHER SPECIFIED POSTPROCEDURAL STATES: Chronic | ICD-10-CM

## 2021-06-12 PROCEDURE — 72125 CT NECK SPINE W/O DYE: CPT | Mod: 26,ME

## 2021-06-12 PROCEDURE — 72125 CT NECK SPINE W/O DYE: CPT

## 2021-06-12 PROCEDURE — 70450 CT HEAD/BRAIN W/O DYE: CPT

## 2021-06-12 PROCEDURE — 70450 CT HEAD/BRAIN W/O DYE: CPT | Mod: 26,MF

## 2021-06-12 PROCEDURE — G1004: CPT

## 2021-06-12 PROCEDURE — 70486 CT MAXILLOFACIAL W/O DYE: CPT | Mod: 26,MG

## 2021-06-12 PROCEDURE — 99285 EMERGENCY DEPT VISIT HI MDM: CPT

## 2021-06-12 PROCEDURE — 70486 CT MAXILLOFACIAL W/O DYE: CPT

## 2021-06-12 PROCEDURE — 99284 EMERGENCY DEPT VISIT MOD MDM: CPT | Mod: 25

## 2021-06-12 RX ORDER — METHOCARBAMOL 500 MG/1
750 TABLET, FILM COATED ORAL ONCE
Refills: 0 | Status: COMPLETED | OUTPATIENT
Start: 2021-06-12 | End: 2021-06-12

## 2021-06-12 RX ORDER — LIDOCAINE 4 G/100G
1 CREAM TOPICAL ONCE
Refills: 0 | Status: COMPLETED | OUTPATIENT
Start: 2021-06-12 | End: 2021-06-12

## 2021-06-12 RX ORDER — ACETAMINOPHEN 500 MG
650 TABLET ORAL ONCE
Refills: 0 | Status: COMPLETED | OUTPATIENT
Start: 2021-06-12 | End: 2021-06-12

## 2021-06-12 RX ADMIN — Medication 650 MILLIGRAM(S): at 15:25

## 2021-06-12 RX ADMIN — LIDOCAINE 1 PATCH: 4 CREAM TOPICAL at 15:25

## 2021-06-12 RX ADMIN — METHOCARBAMOL 750 MILLIGRAM(S): 500 TABLET, FILM COATED ORAL at 15:25

## 2021-06-12 NOTE — ED PROVIDER NOTE - CARE PROVIDER_API CALL
Leanna Powell (DO)  Brain Injury Medicine; PhysicalRehab Medicine  [address] [place] [address]  Phone: [phone]  Fax: [phone]  Follow Up Time: 4-6 Days

## 2021-06-12 NOTE — ED PROVIDER NOTE - PATIENT PORTAL LINK FT
You can access the FollowMyHealth Patient Portal offered by [facility] by registering at the following website: [email or web address]. By joining [employer]’s FollowMyHealth portal, you will also be able to view your health information using other applications (apps) compatible with our system.
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
## 2021-06-12 NOTE — ED PROVIDER NOTE - NSFOLLOWUPINSTRUCTIONS_ED_ALL_ED_FT
- Please follow up with your Primary Care Doctor in 1 - 2 days. If you cannot follow-up with your primary care doctor please return to the Emergency Department for any urgent issues.  - Seek immediate medical care for any new, worsening or concerning signs or symptoms.   - You were given copies of all your test results, please bring to your primary care doctor for review of any abnormal test results    - If you have difficulty following up, please call: [phone] or go to [email or web address] to obtain a [facility] doctor or specialist who takes your insurance in your area.    Feel better!   Concussion    WHAT YOU NEED TO KNOW:    A concussion is a mild brain injury. It is usually caused by a bump or blow to the head from a fall, a motor vehicle crash, or a sports injury. Sometimes being shaken forcefully may cause a concussion.    DISCHARGE INSTRUCTIONS:    Have someone call 911 for any of the following:   •Someone tries to wake you and cannot do so.      •You have a seizure, increasing confusion, or a change in personality.      •Your speech becomes slurred, or you have new vision problems.      Return to the emergency department if:   •You have sudden and new vision problems.      •You have a severe headache that does not go away.      •You have arm or leg weakness, numbness, or new problems with coordination.      •You have blood or clear fluid coming out of the ears or nose.      Contact your healthcare provider if:   •You have nausea or are vomiting.      •You feel more sleepy than usual.      •Your symptoms get worse.      •Your symptoms last longer than 6 weeks after the injury.      •You have questions or concerns about your condition or care.      Medicines: You may need any of the following:   •Acetaminophen decreases pain and fever. It is available without a doctor's order. Ask how much to take and how often to take it. Follow directions. Read the labels of all other medicines you are using to see if they also contain acetaminophen, or ask your doctor or pharmacist. Acetaminophen can cause liver damage if not taken correctly. Do not use more than 4 grams (4,000 milligrams) total of acetaminophen in one day.       •NSAIDs help decrease swelling and pain or fever. This medicine is available with or without a doctor's order. NSAIDs can cause stomach bleeding or kidney problems in certain people. If you take blood thinner medicine, always ask your healthcare provider if NSAIDs are safe for you. Always read the medicine label and follow directions.      •Take your medicine as directed. Contact your healthcare provider if you think your medicine is not helping or if you have side effects. Tell him or her if you are allergic to any medicine. Keep a list of the medicines, vitamins, and herbs you take. Include the amounts, and when and why you take them. Bring the list or the pill bottles to follow-up visits. Carry your medicine list with you in case of an emergency.      Self-care: Concussion symptoms usually go away within about 10 days, but they may last longer. The following may be recommended to manage your symptoms:   •Rest from physical and mental activities as directed. Mental activities are those that require thinking, concentration, and attention. You will need to rest until your symptoms are gone. Rest will allow you to recover from your concussion. Ask your healthcare provider when you can return to work and other daily activities.      •Have someone stay with you for the first 24 hours after your injury. Your healthcare provider should be contacted if your symptoms get worse, or you develop new symptoms.      •Do not participate in sports and physical activities until your healthcare provider says it is okay. They could make your symptoms worse or lead to another concussion. Your healthcare provider will tell you when it is okay for you to return to sports or physical activities. Ask for more information about sports concussions.      Prevent another concussion:   •Wear protective sports equipment that fits properly. Helmets help decrease your risk for a serious brain injury. Talk to your healthcare provider about ways you can decrease your risk for a concussion if you play sports.      •Wear your seatbelt every time you travel. This helps to decrease your risk for a head injury if you are in a car accident.       Follow up with your healthcare provider as directed: Write down your questions so you remember to ask them during your visits.       Acute Low Back Pain    WHAT YOU NEED TO KNOW:    Acute low back pain is sudden discomfort that lasts up to 6 weeks and makes activity difficult.    DISCHARGE INSTRUCTIONS:    Return to the emergency department if:   •You have severe pain.      •You have sudden stiffness and heaviness on both buttocks down to both legs.      •You have numbness or weakness in one leg, or pain in both legs.      •You have numbness in your genital area or across your lower back.      •You cannot control your urine or bowel movements.      Call your doctor if:   •You have a fever.      •You have pain at night or when you rest.      •Your pain does not get better with treatment.      •You have pain that worsens when you cough or sneeze.      •You suddenly feel something pop or snap in your back.      •You have questions or concerns about your condition or care.      Medicines: You may need any of the following:  •NSAIDs, such as ibuprofen, help decrease swelling, pain, and fever. This medicine is available with or without a doctor's order. NSAIDs can cause stomach bleeding or kidney problems in certain people. If you take blood thinner medicine, always ask your healthcare provider if NSAIDs are safe for you. Always read the medicine label and follow directions.      •Acetaminophen decreases pain and fever. It is available without a doctor's order. Ask how much to take and how often to take it. Follow directions. Read the labels of all other medicines you are using to see if they also contain acetaminophen, or ask your doctor or pharmacist. Acetaminophen can cause liver damage if not taken correctly. Do not use more than 4 grams (4,000 milligrams) total of acetaminophen in one day.       •Muscle relaxers decrease pain by relaxing the muscles in your lower spine.      •Prescription pain medicine may be given. Ask your healthcare provider how to take this medicine safely. Some prescription pain medicines contain acetaminophen. Do not take other medicines that contain acetaminophen without talking to your healthcare provider. Too much acetaminophen may cause liver damage. Prescription pain medicine may cause constipation. Ask your healthcare provider how to prevent or treat constipation.       Self-care:   •Stay active as much as you can without causing more pain. Bed rest could make your back pain worse. Start with some light exercises, such as walking. Avoid heavy lifting until your pain is gone. Ask for more information about the activities or exercises that are right for you.   Family Walking for Exercise           •Apply heat on your back for 20 to 30 minutes every 2 hours for as many days as directed. Heat helps decrease pain and muscle spasms. Alternate heat and ice.      •Apply ice on your back for 15 to 20 minutes every hour or as directed. Use an ice pack, or put crushed ice in a plastic bag. Cover it with a towel before you apply it to your skin. Ice helps prevent tissue damage and decreases swelling and pain.      Prevent acute low back pain:   •Use proper body mechanics. ?Bend at the hips and knees when you  objects. Do not bend from the waist. Use your leg muscles as you lift the load. Do not use your back. Keep the object close to your chest as you lift it. Try not to twist or lift anything above your waist.      ?Change your position often when you stand for long periods of time. Rest one foot on a small box or footrest, and then switch to the other foot often.      ?Try not to sit for long periods of time. When you do, sit in a straight-backed chair with your feet flat on the floor. Never reach, pull, or push while you are sitting.      •Do exercises that strengthen your back muscles. Warm up before you exercise. Ask your healthcare provider the best exercises for you.      •Maintain a healthy weight. Ask your healthcare provider what a healthy weight is for you. Ask him or her to help you create a weight loss plan if you are overweight.      Follow up with your doctor as directed: Return for a follow-up visit if you still have pain after 1 to 3 weeks of treatment. You may need to visit an orthopedist if your back pain lasts longer than 12 weeks. Write down your questions so you remember to ask them during your visits.

## 2021-06-12 NOTE — ED PROVIDER NOTE - CARE PROVIDERS DIRECT ADDRESSES
[email or web address]

## 2021-06-12 NOTE — ED PROVIDER NOTE - PROGRESS NOTE DETAILS
CARLEEN Gomez NOTE: Reviewed all results and plan with Dr. Bell  Pt stable for d/c, reports improvement, VSS, tolerating PO, ambulatory.  Discussion includes results, plan, proper medication use/side effects, and return precautions. Pt advised to f/u with PMD 1-2 days and specialists discussed.  Printed copies of available lab/radiology results contained within discharge packet. Pt verbalized understanding/agreement of plan.
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
## 2021-06-12 NOTE — ED PROVIDER NOTE - PHYSICAL EXAMINATION
Vital signs noted, see flowsheet.  General: NAD, well appearing and non-toxic.  HEENT: NC/AT. MMM. Conjunctiva and sclera clear b/l.  EOMI. PERRL.  Neck: Soft and supple, full ROM without pain.  Cardiac: RRR. +S1/S2. Peripheral pulses 2+ and symmetric b/l.   Respiratory: Speaking in full sentences, no evidence of respiratory distress. Lungs CTA b/l, no wheezes/rhonchi/rales/stridor.   Abdomen: Normoactive bowel sounds x 4 quadrants. Soft, NTND. No guarding or rebound tenderness.   Back: Spine midline and non-tender. No CVAT. Left paraspinal lumbar ttp   Skin: Normal color for race, no evidence of rash, ecchymosis, cyanosis or jaundice.   Neuro: Awake, alert and oriented to person/place/time/situation. Moves all extremities spontaneously and symmetrically.  No focal deficits or facial droop.  CN II-XII intact grossly. Sensation intact, strength good. Ambulates   Psych: Normal affect

## 2021-06-12 NOTE — ED PROVIDER NOTE - NSFOLLOWUPCLINICS_GEN_ALL_ED_FT
[facility] - [facility]  Ortho/Spine  [address] [place] [address]  Phone: [phone]  Fax:   Follow Up Time: 4-6 Days

## 2021-06-15 ENCOUNTER — APPOINTMENT (OUTPATIENT)
Dept: PHYSICAL MEDICINE AND REHAB | Facility: CLINIC | Age: 26
End: 2021-06-15
Payer: COMMERCIAL

## 2021-06-15 VITALS
WEIGHT: 170 LBS | TEMPERATURE: 97 F | DIASTOLIC BLOOD PRESSURE: 83 MMHG | HEIGHT: 68 IN | RESPIRATION RATE: 14 BRPM | HEART RATE: 75 BPM | BODY MASS INDEX: 25.76 KG/M2 | SYSTOLIC BLOOD PRESSURE: 128 MMHG

## 2021-06-15 DIAGNOSIS — Z78.9 OTHER SPECIFIED HEALTH STATUS: ICD-10-CM

## 2021-06-15 DIAGNOSIS — M54.5 LOW BACK PAIN: ICD-10-CM

## 2021-06-15 DIAGNOSIS — F17.200 NICOTINE DEPENDENCE, UNSPECIFIED, UNCOMPLICATED: ICD-10-CM

## 2021-06-15 PROCEDURE — 99072 ADDL SUPL MATRL&STAF TM PHE: CPT

## 2021-06-15 PROCEDURE — 99204 OFFICE O/P NEW MOD 45 MIN: CPT

## 2021-06-15 PROCEDURE — 72100 X-RAY EXAM L-S SPINE 2/3 VWS: CPT

## 2021-06-15 RX ORDER — AMOXICILLIN 500 MG/1
500 TABLET, FILM COATED ORAL TWICE DAILY
Qty: 56 | Refills: 0 | Status: DISCONTINUED | COMMUNITY
Start: 2021-05-24 | End: 2021-06-15

## 2021-06-15 RX ORDER — CLARITHROMYCIN 500 MG/1
500 TABLET, FILM COATED ORAL TWICE DAILY
Qty: 28 | Refills: 0 | Status: DISCONTINUED | COMMUNITY
Start: 2021-05-24 | End: 2021-06-15

## 2021-06-15 RX ORDER — METRONIDAZOLE 500 MG/1
500 TABLET ORAL TWICE DAILY
Qty: 28 | Refills: 0 | Status: DISCONTINUED | COMMUNITY
Start: 2021-05-24 | End: 2021-06-15

## 2021-06-15 RX ORDER — OMEPRAZOLE 20 MG/1
20 TABLET, DELAYED RELEASE ORAL
Qty: 28 | Refills: 0 | Status: DISCONTINUED | COMMUNITY
Start: 2021-05-24 | End: 2021-06-15
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
## 2021-06-15 NOTE — DATA REVIEWED
[Plain X-Rays] : plain X-Rays [FreeTextEntry1] : X-rays L-spine (2 views obtained at today's office visit): levocurvature lumbar spine w/ apex at L2; no listhesis; disc space heights well preserved; SI joints intact

## 2021-06-15 NOTE — PHYSICAL EXAM
[FreeTextEntry1] : NAD \par A&Ox3\par Non-obese\par Asymmetric waist creases\par Left rotatory T-L scoliosis\par ROM L-spine: 3/4 full forward flexion w/ pain; 30' passive lumbar extension w/o pain\par ROM Hips: smooth IR/ER w/o pain\par Pelvic tilt: slight\par Seated slump test: neg\par SLR: neg\par PERCY's: neg\par Knees: b/l genu recurvatum\par Ankles: b/l subtalar hyperpronation\par DTR's: 2+ knees; 1+ ankles\par MMT: 5/5/ b/l LE\par Sensation: SILT\par Toe & Heel Walk: Yes\par Palpation: No discrete lumbar myofascial TPs\par
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
## 2021-06-18 ENCOUNTER — APPOINTMENT (OUTPATIENT)
Dept: GASTROENTEROLOGY | Facility: CLINIC | Age: 26
End: 2021-06-18
Payer: MEDICAID

## 2021-06-18 VITALS
HEIGHT: 68 IN | DIASTOLIC BLOOD PRESSURE: 76 MMHG | BODY MASS INDEX: 26.67 KG/M2 | SYSTOLIC BLOOD PRESSURE: 120 MMHG | TEMPERATURE: 98.1 F | WEIGHT: 176 LBS | HEART RATE: 69 BPM

## 2021-06-18 DIAGNOSIS — R10.33 PERIUMBILICAL PAIN: ICD-10-CM

## 2021-06-18 PROCEDURE — 99213 OFFICE O/P EST LOW 20 MIN: CPT

## 2021-06-18 RX ORDER — IBUPROFEN 800 MG/1
800 TABLET, FILM COATED ORAL
Qty: 50 | Refills: 0 | Status: DISCONTINUED | COMMUNITY
Start: 2021-01-12

## 2021-06-18 RX ORDER — METHOCARBAMOL 750 MG/1
750 TABLET, FILM COATED ORAL
Qty: 18 | Refills: 0 | Status: DISCONTINUED | COMMUNITY
Start: 2021-06-04

## 2021-06-18 RX ORDER — IBUPROFEN 600 MG/1
600 TABLET, FILM COATED ORAL
Qty: 28 | Refills: 0 | Status: DISCONTINUED | COMMUNITY
Start: 2021-06-04

## 2021-06-19 PROBLEM — R10.33 COLICKY PERIUMBILICAL ABDOMINAL PAIN: Status: ACTIVE | Noted: 2021-05-17

## 2021-06-19 NOTE — PHYSICAL EXAM
[General Appearance - Alert] : alert [General Appearance - In No Acute Distress] : in no acute distress [Outer Ear] : the ears and nose were normal in appearance [Oropharynx] : the oropharynx was normal [Neck Appearance] : the appearance of the neck was normal [Neck Cervical Mass (___cm)] : no neck mass was observed [Jugular Venous Distention Increased] : there was no jugular-venous distention [Thyroid Diffuse Enlargement] : the thyroid was not enlarged [Thyroid Nodule] : there were no palpable thyroid nodules [Auscultation Breath Sounds / Voice Sounds] : lungs were clear to auscultation bilaterally [Heart Rate And Rhythm] : heart rate was normal and rhythm regular [Heart Sounds] : normal S1 and S2 [Heart Sounds Gallop] : no gallops [Murmurs] : no murmurs [Heart Sounds Pericardial Friction Rub] : no pericardial rub [Abdomen Soft] : soft [Bowel Sounds] : normal bowel sounds [Abdomen Tenderness] : non-tender [] : no hepato-splenomegaly [Normal Sphincter Tone] : normal sphincter tone [Abdomen Mass (___ Cm)] : no abdominal mass palpated [No Rectal Mass] : no rectal mass [Internal Hemorrhoid] : no internal hemorrhoids [External Hemorrhoid] : no external hemorrhoids [FreeTextEntry1] : Normal tone.  No lesions.  No hemorrhoids.  Small amount of brown stool in rectal vault.  No prostate tenderness.  No blood.  Occult blood negative. [Occult Blood Positive] : stool was negative for occult blood

## 2021-06-19 NOTE — HISTORY OF PRESENT ILLNESS
[FreeTextEntry1] : 25-year-old  male with no significant past medical or surgical history had presented 1 month ago with a complaint of epigastric and periumbilical abdominal pain twice weekly for the past 10 weeks.  No bleeding.  No vomiting.  No weight loss.  No alteration in bowel habits.  Patient was referred for blood work which was normal including amylase and lipase and CBC and CMP and LFTs.  A urea breath test was also performed it was positive for H. pylori.  Patient has since been prescribed quadruple therapy for eradication of H. pylori and is currently at day 7 of the 14-day regimen of Amoxil clarithromycin Flagyl and omeprazole.  Some nausea is noted.  No vomiting.  Modest dyspepsia persists.  No diarrhea.  No weight loss.  \par Patient had previously been advised a CAT scan.  This was never performed due to scheduling confusion.

## 2021-06-19 NOTE — ASSESSMENT
[FreeTextEntry1] : New diagnosis of H. pylori infection likely gastritis.  Currently on quadruple therapy.  Completed 1 week of a 2-week course.  No significant medication side effects.\par \par Plan: complete course of therapy as prescribed.  Then 1 month drug holiday.  \par Then repeat urea breath test for test of cure.  Call for results.\par Defer on abdominal CAT scan for now.\par GI office follow-up thereafter on as needed basis.

## 2021-06-24 ENCOUNTER — APPOINTMENT (OUTPATIENT)
Dept: PHYSICAL MEDICINE AND REHAB | Facility: CLINIC | Age: 26
End: 2021-06-24

## 2021-07-27 ENCOUNTER — APPOINTMENT (OUTPATIENT)
Dept: PHYSICAL MEDICINE AND REHAB | Facility: CLINIC | Age: 26
End: 2021-07-27

## 2021-07-27 LAB — UREA BREATH TEST QL: NEGATIVE

## 2021-08-23 ENCOUNTER — APPOINTMENT (OUTPATIENT)
Dept: GASTROENTEROLOGY | Facility: CLINIC | Age: 26
End: 2021-08-23

## 2021-12-16 ENCOUNTER — EMERGENCY (EMERGENCY)
Facility: HOSPITAL | Age: 26
LOS: 1 days | Discharge: HOME | End: 2021-12-16
Attending: EMERGENCY MEDICINE
Payer: MEDICAID

## 2021-12-16 VITALS
OXYGEN SATURATION: 98 % | SYSTOLIC BLOOD PRESSURE: 120 MMHG | TEMPERATURE: 98 F | HEIGHT: 67 IN | WEIGHT: 169.98 LBS | HEART RATE: 82 BPM | RESPIRATION RATE: 18 BRPM | DIASTOLIC BLOOD PRESSURE: 83 MMHG

## 2021-12-16 DIAGNOSIS — Z98.890 OTHER SPECIFIED POSTPROCEDURAL STATES: Chronic | ICD-10-CM

## 2021-12-16 PROCEDURE — 71046 X-RAY EXAM CHEST 2 VIEWS: CPT | Mod: 26

## 2021-12-16 PROCEDURE — 99284 EMERGENCY DEPT VISIT MOD MDM: CPT

## 2021-12-16 PROCEDURE — 93010 ELECTROCARDIOGRAM REPORT: CPT

## 2021-12-16 PROCEDURE — 99283 EMERGENCY DEPT VISIT LOW MDM: CPT | Mod: 25

## 2021-12-16 PROCEDURE — 93005 ELECTROCARDIOGRAM TRACING: CPT

## 2021-12-16 PROCEDURE — 71046 X-RAY EXAM CHEST 2 VIEWS: CPT

## 2021-12-16 NOTE — ED ADULT TRIAGE NOTE - CHIEF COMPLAINT QUOTE
pt sent from [facility] for CP, left side, sent for further evaluation, EKG & Troponin recommended,  has bili in urine, at first stated abdominal pain  A&Ox3, resp wnl

## 2021-12-16 NOTE — ED STATDOCS - PATIENT PORTAL LINK FT
You can access the FollowMyHealth Patient Portal offered by [facility] by registering at the following website: [email or web address]. By joining [email or web address]’s FollowMyHealth portal, you will also be able to view your health information using other applications (apps) compatible with our system.

## 2021-12-16 NOTE — ED STATDOCS - CLINICAL SUMMARY MEDICAL DECISION MAKING FREE TEXT BOX
Most likely viral gastroenteritis, (+) sick contact dad at home with the same. EKG WNL, no risk factors or family hx. Will do CXR, P.O. challenge, and anticipate D/C. Not likely ACS, chest pain after vomiting. Most likely viral gastroenteritis, (+) sick contact dad at home with the same gi symptoms. EKG WNL, no risk factors or family hx. Will do CXR, P.O. challenge, and anticipate D/C. Not likely ACS, chest pain after vomiting.

## 2021-12-16 NOTE — ED STATDOCS - NSICDXFAMILYHX_GEN_ALL_CORE_FT
FAMILY HISTORY:  Aunt  Still living? Unknown  Family history of diabetes mellitus, Age at diagnosis: Age Unknown

## 2021-12-16 NOTE — ED STATDOCS - PHYSICAL EXAMINATION
Head: atraumatic, normacephalic  Face: atraumatic, no crepitus no orbiral/maxillary/mandibular ttp  throat: uvula midline no exudates  eyes: perrla eomi  heart: rrr s1s2  lungs: ctab  abd: soft, nt nd +bs no rebound/guarding no cva ttp  skin: warm  LE: no swelling, no calf ttp  back: no midline cervical/thoracic/lumbar TTP

## 2021-12-16 NOTE — ED STATDOCS - OBJECTIVE STATEMENT
25 y/o male with PMHx of ETOH abuse, Polysubstance abuse, Scoliosis, Seizures, and ulcers presents to ED c/o chest pressure. Patient reports he was seen at urgent care yesterday for N/V/D and intermittent non-radiating chest pain that began after vomiting. Urgent care referred patient to the ED, went to [facility] but was in the waiting room for 6 hours, and left without being evaluated. Today, patient still has nausea and diarrhea. Reports his father is having similar symptoms. Patient is also endorsing back pain.     Denies f/c/sob/palpitations/cough/rash/headache/dizziness/abd.pain/c/dysuria/hematuria/recent travel/surgical hx/allergies/hx of DVT/PE/family hx of cardiac disease 25 y/o male with PMHx of ETOH abuse, Polysubstance abuse, Scoliosis, Seizures, and ulcers presents to ED c/o chest pressure. Patient reports he was seen at urgent care yesterday for N/V/D and intermittent non-radiating chest pain that began after vomiting. Urgent care referred patient to the ED, went to [facility] but was in the waiting room for 6 hours, and left without being evaluated. Today, patient still has nausea and diarrhea. Reports his father is having similar symptoms of n/v/d.     Denies f/c/sob/palpitations/cough/rash/headache/dizziness/abd.pain/c/dysuria/hematuria/recent travel/surgical hx/allergies/hx of DVT/PE/family hx of cardiac disease

## 2021-12-16 NOTE — ED STATDOCS - CHIEF COMPLAINT
Clinic hours for Dr. Almanzar:  Monday 8:20am - 4:30pm  Tuesday 8:20am - 4:30pm  Wednesday 8:20am - 4:30pm  Thursday 8:20am - 4:30pm  Friday           Not in    If you need a refill on your prescription please call your pharmacy and let them know. Please be proactive and call before your medication runs out. The pharmacy will then contact us for the refill. Please allow 24-48 hours for the refill to be processed.     If your physician has ordered additional laboratory or radiology testing the results will be discussed at your next visit. This will allow you the opportunity to go over the results in person with your provider. If your results require immediate intervention, you will be contacted sooner by phone call.     You may be receiving a patient satisfaction survey in the mail. Please take the time to complete, as your feedback is very important to us. We strive to make your experience exceptional and your comments help us with that goal. We look forward to hearing from you.       Continue with present medications.  Continue with Soliqua 34 units daily. If fasting glucose levels average above 140 mg/dl advise to increase Soliqua to 36 units daily.  If you are on Prednisone or steroid injection then increase Soliqua to 38 units.  Follow-up in 7 months.  Labs A1C test before follow-up with Dr. Rojas.     The patient is a 26y year old Male complaining of chest pressure.

## 2021-12-16 NOTE — ED STATDOCS - NSICDXPASTMEDICALHX_GEN_ALL_CORE_FT
PAST MEDICAL HISTORY:  ETOH abuse     Polysubstance abuse Heroin, Marijuana and Xanax    Scoliosis     Seizure last seizure approx 5 yrs ago

## 2021-12-16 NOTE — ED STATDOCS - ATTENDING CONTRIBUTION TO CARE
I, Naila Hill, performed the initial face to face bedside interview with this patient regarding history of present illness, review of symptoms and relevant past medical, social and family history.  I completed an independent physical examination.  I was the initial provider who evaluated this patient. I have signed out the follow up of any pending tests (i.e. labs, radiological studies) to the ACP.  I have communicated the patient’s plan of care and disposition with the ACP.

## 2022-03-15 ENCOUNTER — APPOINTMENT (OUTPATIENT)
Dept: GASTROENTEROLOGY | Facility: CLINIC | Age: 27
End: 2022-03-15
Payer: MEDICAID

## 2022-03-15 VITALS
TEMPERATURE: 98 F | DIASTOLIC BLOOD PRESSURE: 70 MMHG | HEART RATE: 80 BPM | BODY MASS INDEX: 24.25 KG/M2 | HEIGHT: 68 IN | OXYGEN SATURATION: 98 % | RESPIRATION RATE: 16 BRPM | SYSTOLIC BLOOD PRESSURE: 120 MMHG | WEIGHT: 160 LBS

## 2022-03-15 DIAGNOSIS — A04.8 OTHER SPECIFIED BACTERIAL INTESTINAL INFECTIONS: ICD-10-CM

## 2022-03-15 DIAGNOSIS — R19.8 OTHER SPECIFIED SYMPTOMS AND SIGNS INVOLVING THE DIGESTIVE SYSTEM AND ABDOMEN: ICD-10-CM

## 2022-03-15 PROCEDURE — 99213 OFFICE O/P EST LOW 20 MIN: CPT

## 2022-03-15 NOTE — ASSESSMENT
[FreeTextEntry1] : H. pylori gastritis identified, treated and resolved.  Left upper quadrant discomfort/dyspepsia has resolved.\par Physical exam is normal.  No active GI issues.  No need for endoscopy or colonoscopy examination at this time.  \par Patient was advised to follow-up here in GI clinic on as needed basis.\par \par Family history is negative for colorectal neoplasia.  Patient is asymptomatic from a lower GI point of view.  Therefore patient is at average risk for development of colorectal neoplasia.  Screening colonoscopy at age 43 advised.

## 2022-03-15 NOTE — HISTORY OF PRESENT ILLNESS
[FreeTextEntry1] : 26-year-old  male who had presented for GI evaluation in 5/22/2021 for left upper quadrant discomfort.  A urea breath test was positive for H. pylori.  He was subsequently treated with quadruple antibiotic therapy in 6/21 and a urea breath test done 7/26/2021 was negative thus establishing resolution of the H. pylori infection.  Since that time the majority of his left upper quadrant discomfort has resolved.  He has only episodic mild discomfort in the left side usually resolved with the passage of a bowel movement.  Bowel movements are regular and formed.  No diarrhea or bleeding.  No abdominal pain.  No weight loss.  Appetite is normal.  No heartburn belching or dyspepsia.  Feels well.

## 2022-09-09 NOTE — ED ADULT NURSE NOTE - RESPIRATORY WDL
Afib
Breathing spontaneous and unlabored. Breath sounds clear and equal bilaterally with regular rhythm.

## 2022-09-16 NOTE — SBIRT NOTE. - NSSBIRTFULLSCREEN_GEN_A_ED_FT
Clinic Care Coordination Contact    Follow Up Progress Note      Assessment: pt returned call.  She noted she is still struggling with her finances.  She is continuing with her class and it is making more sense every day.      She is considering moving funds from an account she uses for her vacations to her regular bank account to pay for bills.  She is also going to look into doing more door dash.     Pt was ready to restart her exercise goal.  Discussed how it can help with her MH as well.  She continues with counseling.     Care Gaps:    Health Maintenance Due   Topic Date Due     URINE DRUG SCREEN  Never done     Pneumococcal Vaccine: Pediatrics (0 to 5 Years) and At-Risk Patients (6 to 64 Years) (2 - PCV) 09/16/2017     COVID-19 Vaccine (4 - Booster for Pfizer series) 04/04/2022     INFLUENZA VACCINE (1) 09/01/2022     LUNG CANCER SCREENING  10/13/2022       Care Gaps Last addressed on 9/16/22    Care Plans  Care Plan: Financial Wellbeing     Problem: Patient expresses financial resource strain     Goal: Create an action plan to increase financial stability     Start Date: 6/27/2022 Expected End Date: 12/24/2022    This Visit's Progress: 30%    Note:     Barriers: course needed is not available (quick books)  Strengths: going to class's    Patient expressed understanding of goal: yes  Action steps to achieve this goal:  1. I will continue with classes  2. I will look for the course I need and schedule  3. I will complete my resume  4. I will work with the workforce center to get the needed programs                    Care Plan: Physical Activity     Problem: Patient is inactive     Goal: Exercise at Least 20 Minutes per Day     Start Date: 4/27/2020 Expected End Date: 9/16/2023    This Visit's Progress: 0%    Note:     Barriers: Depression, back pain, social distancing to some extent  Strengths: Understand that increasing my exercise will help with managing my weight and back pain    Patient expressed  Meeting with patient attempted however Full Screen Not Performed due to   Severity of illness/mental status - Provided pt's father w Naloxone understanding of goal: Yes  Action steps to achieve this goal:  1. I will walk in one direction for 5 minutes and then return for a total of 10 minutes  2. I will not get discouraged if I cannot make it the 5 minutes in one direction before having to turn around  3. I will celebrate my successes                        Intervention/Education provided during outreach: listened and encouraged continued focus on what is best for her.  Reminded her of the benefits of exercise in many areas of her life.     Outreach Frequency: monthly      Plan:   Will send updated care plan.   Pt to work on her finances and exercises.   Care Coordinator will follow up in one month.     Bell Brito [facility] - Care Coordinator   9/16/2022   12:29 PM  [phone]

## 2024-09-13 NOTE — ED PROVIDER NOTE - TEMPLATE
[0] : 2) Feeling down, depressed, or hopeless: Not at all (0) [PHQ-2 Negative - No further assessment needed] : PHQ-2 Negative - No further assessment needed [Never] : Never [[id number]] : 0 Toxicology

## 2025-05-07 ENCOUNTER — EMERGENCY (EMERGENCY)
Facility: HOSPITAL | Age: 30
LOS: 1 days | End: 2025-05-07
Attending: EMERGENCY MEDICINE
Payer: COMMERCIAL

## 2025-05-07 VITALS
DIASTOLIC BLOOD PRESSURE: 82 MMHG | TEMPERATURE: 98 F | OXYGEN SATURATION: 99 % | SYSTOLIC BLOOD PRESSURE: 117 MMHG | RESPIRATION RATE: 18 BRPM | HEART RATE: 73 BPM | HEIGHT: 64 IN | WEIGHT: 175.05 LBS

## 2025-05-07 DIAGNOSIS — Z98.890 OTHER SPECIFIED POSTPROCEDURAL STATES: Chronic | ICD-10-CM

## 2025-05-07 LAB
ALBUMIN SERPL ELPH-MCNC: 4.5 G/DL — SIGNIFICANT CHANGE UP (ref 3.3–5.2)
ALP SERPL-CCNC: 48 U/L — SIGNIFICANT CHANGE UP (ref 40–120)
ALT FLD-CCNC: 37 U/L — SIGNIFICANT CHANGE UP
ANION GAP SERPL CALC-SCNC: 13 MMOL/L — SIGNIFICANT CHANGE UP (ref 5–17)
AST SERPL-CCNC: 30 U/L — SIGNIFICANT CHANGE UP
BASOPHILS # BLD AUTO: 0.03 K/UL — SIGNIFICANT CHANGE UP (ref 0–0.2)
BASOPHILS NFR BLD AUTO: 0.4 % — SIGNIFICANT CHANGE UP (ref 0–2)
BILIRUB SERPL-MCNC: 0.6 MG/DL — SIGNIFICANT CHANGE UP (ref 0.4–2)
BUN SERPL-MCNC: 14.1 MG/DL — SIGNIFICANT CHANGE UP (ref 8–20)
CALCIUM SERPL-MCNC: 9.1 MG/DL — SIGNIFICANT CHANGE UP (ref 8.4–10.5)
CHLORIDE SERPL-SCNC: 103 MMOL/L — SIGNIFICANT CHANGE UP (ref 96–108)
CK SERPL-CCNC: 71 U/L — SIGNIFICANT CHANGE UP (ref 30–200)
CO2 SERPL-SCNC: 24 MMOL/L — SIGNIFICANT CHANGE UP (ref 22–29)
CREAT SERPL-MCNC: 0.49 MG/DL — LOW (ref 0.5–1.3)
D DIMER BLD IA.RAPID-MCNC: <150 NG/ML DDU — SIGNIFICANT CHANGE UP
EGFR: 141 ML/MIN/1.73M2 — SIGNIFICANT CHANGE UP
EGFR: 141 ML/MIN/1.73M2 — SIGNIFICANT CHANGE UP
EOSINOPHIL # BLD AUTO: 0.09 K/UL — SIGNIFICANT CHANGE UP (ref 0–0.5)
EOSINOPHIL NFR BLD AUTO: 1.1 % — SIGNIFICANT CHANGE UP (ref 0–6)
GLUCOSE SERPL-MCNC: 92 MG/DL — SIGNIFICANT CHANGE UP (ref 70–99)
HCT VFR BLD CALC: 42.6 % — SIGNIFICANT CHANGE UP (ref 39–50)
HGB BLD-MCNC: 14 G/DL — SIGNIFICANT CHANGE UP (ref 13–17)
IMM GRANULOCYTES # BLD AUTO: 0.02 K/UL — SIGNIFICANT CHANGE UP (ref 0–0.07)
IMM GRANULOCYTES NFR BLD AUTO: 0.2 % — SIGNIFICANT CHANGE UP (ref 0–0.9)
LIDOCAIN IGE QN: 36 U/L — SIGNIFICANT CHANGE UP (ref 22–51)
LYMPHOCYTES # BLD AUTO: 1.97 K/UL — SIGNIFICANT CHANGE UP (ref 1–3.3)
LYMPHOCYTES NFR BLD AUTO: 23.7 % — SIGNIFICANT CHANGE UP (ref 13–44)
MAGNESIUM SERPL-MCNC: 2 MG/DL — SIGNIFICANT CHANGE UP (ref 1.6–2.6)
MCHC RBC-ENTMCNC: 27.2 PG — SIGNIFICANT CHANGE UP (ref 27–34)
MCHC RBC-ENTMCNC: 32.9 G/DL — SIGNIFICANT CHANGE UP (ref 32–36)
MCV RBC AUTO: 82.9 FL — SIGNIFICANT CHANGE UP (ref 80–100)
MONOCYTES # BLD AUTO: 0.98 K/UL — HIGH (ref 0–0.9)
MONOCYTES NFR BLD AUTO: 11.8 % — SIGNIFICANT CHANGE UP (ref 2–14)
NEUTROPHILS # BLD AUTO: 5.23 K/UL — SIGNIFICANT CHANGE UP (ref 1.8–7.4)
NEUTROPHILS NFR BLD AUTO: 62.8 % — SIGNIFICANT CHANGE UP (ref 43–77)
NRBC # BLD AUTO: 0 K/UL — SIGNIFICANT CHANGE UP (ref 0–0)
NRBC # FLD: 0 K/UL — SIGNIFICANT CHANGE UP (ref 0–0)
NRBC BLD AUTO-RTO: 0 /100 WBCS — SIGNIFICANT CHANGE UP (ref 0–0)
NT-PROBNP SERPL-SCNC: <36 PG/ML — SIGNIFICANT CHANGE UP (ref 0–300)
PLATELET # BLD AUTO: 289 K/UL — SIGNIFICANT CHANGE UP (ref 150–400)
PMV BLD: 10.2 FL — SIGNIFICANT CHANGE UP (ref 7–13)
POTASSIUM SERPL-MCNC: 4 MMOL/L — SIGNIFICANT CHANGE UP (ref 3.5–5.3)
POTASSIUM SERPL-SCNC: 4 MMOL/L — SIGNIFICANT CHANGE UP (ref 3.5–5.3)
PROT SERPL-MCNC: 7.5 G/DL — SIGNIFICANT CHANGE UP (ref 6.6–8.7)
RBC # BLD: 5.14 M/UL — SIGNIFICANT CHANGE UP (ref 4.2–5.8)
RBC # FLD: 13.7 % — SIGNIFICANT CHANGE UP (ref 10.3–14.5)
SODIUM SERPL-SCNC: 140 MMOL/L — SIGNIFICANT CHANGE UP (ref 135–145)
TROPONIN T, HIGH SENSITIVITY RESULT: <6 NG/L — SIGNIFICANT CHANGE UP (ref 0–51)
WBC # BLD: 8.32 K/UL — SIGNIFICANT CHANGE UP (ref 3.8–10.5)
WBC # FLD AUTO: 8.32 K/UL — SIGNIFICANT CHANGE UP (ref 3.8–10.5)

## 2025-05-07 PROCEDURE — 85025 COMPLETE CBC W/AUTO DIFF WBC: CPT

## 2025-05-07 PROCEDURE — 36415 COLL VENOUS BLD VENIPUNCTURE: CPT

## 2025-05-07 PROCEDURE — 85379 FIBRIN DEGRADATION QUANT: CPT

## 2025-05-07 PROCEDURE — 82550 ASSAY OF CK (CPK): CPT

## 2025-05-07 PROCEDURE — 80053 COMPREHEN METABOLIC PANEL: CPT

## 2025-05-07 PROCEDURE — 83735 ASSAY OF MAGNESIUM: CPT

## 2025-05-07 PROCEDURE — 71046 X-RAY EXAM CHEST 2 VIEWS: CPT

## 2025-05-07 PROCEDURE — 99285 EMERGENCY DEPT VISIT HI MDM: CPT

## 2025-05-07 PROCEDURE — 96374 THER/PROPH/DIAG INJ IV PUSH: CPT

## 2025-05-07 PROCEDURE — 99285 EMERGENCY DEPT VISIT HI MDM: CPT | Mod: 25

## 2025-05-07 PROCEDURE — 83880 ASSAY OF NATRIURETIC PEPTIDE: CPT

## 2025-05-07 PROCEDURE — 83690 ASSAY OF LIPASE: CPT

## 2025-05-07 PROCEDURE — 93005 ELECTROCARDIOGRAM TRACING: CPT

## 2025-05-07 PROCEDURE — 71046 X-RAY EXAM CHEST 2 VIEWS: CPT | Mod: 26

## 2025-05-07 PROCEDURE — 84484 ASSAY OF TROPONIN QUANT: CPT

## 2025-05-07 PROCEDURE — 93010 ELECTROCARDIOGRAM REPORT: CPT

## 2025-05-07 RX ORDER — KETOROLAC TROMETHAMINE 30 MG/ML
30 INJECTION, SOLUTION INTRAMUSCULAR; INTRAVENOUS ONCE
Refills: 0 | Status: DISCONTINUED | OUTPATIENT
Start: 2025-05-07 | End: 2025-05-07

## 2025-05-07 RX ORDER — METHOCARBAMOL 500 MG/1
750 TABLET, FILM COATED ORAL ONCE
Refills: 0 | Status: COMPLETED | OUTPATIENT
Start: 2025-05-07 | End: 2025-05-07

## 2025-05-07 RX ORDER — KETOROLAC TROMETHAMINE 30 MG/ML
15 INJECTION, SOLUTION INTRAMUSCULAR; INTRAVENOUS ONCE
Refills: 0 | Status: DISCONTINUED | OUTPATIENT
Start: 2025-05-07 | End: 2025-05-07

## 2025-05-07 RX ORDER — MAGNESIUM, ALUMINUM HYDROXIDE 200-200 MG
30 TABLET,CHEWABLE ORAL ONCE
Refills: 0 | Status: COMPLETED | OUTPATIENT
Start: 2025-05-07 | End: 2025-05-07

## 2025-05-07 RX ADMIN — METHOCARBAMOL 750 MILLIGRAM(S): 500 TABLET, FILM COATED ORAL at 09:20

## 2025-05-07 RX ADMIN — Medication 30 MILLILITER(S): at 09:20

## 2025-05-07 RX ADMIN — KETOROLAC TROMETHAMINE 15 MILLIGRAM(S): 30 INJECTION, SOLUTION INTRAMUSCULAR; INTRAVENOUS at 09:20

## 2025-05-07 NOTE — ED PROVIDER NOTE - PROGRESS NOTE DETAILS
Blood work EKG and chest x-ray without significant finding will discharge patient follow-up outpatient cardiology . will given for Sbirt out pt f,u. pt toña the pain now is gone

## 2025-05-07 NOTE — ED ADULT TRIAGE NOTE - CHIEF COMPLAINT QUOTE
pt states woke up this morning with L sided CP with radiation now L arm. pain worse with inspiration. denies medical hx

## 2025-05-07 NOTE — ED PROVIDER NOTE - ATTENDING APP SHARED VISIT CONTRIBUTION OF CARE
Pt presents with L CP radiating to L neck and worse with inspiration  Started last night, treated with "icy hot" w/o relief  Had pain this morning when he woke up but it did not wake him from sleep  Pain as "discomfort" more than sharp or pressure  no past similar episodes. No fhx CAD/CAD risk factors  Pt past hx substance abuse and smoking marijuana but states he has not used in a very long time  Pt admits to almost every day etoh, 9 beers per day. *Pt open to SBIRT intervention*  PE no focal findings. EKG NSR.   eval including CXR lab trop dimer  agree w eval and mngt plan

## 2025-05-07 NOTE — ED PROVIDER NOTE - OBJECTIVE STATEMENT
29 y.o male PMH of alcohol abuse, scoliosis, hx of polysubstance abuse , presents in ER and  left side of the chest pain goes let arm and left side of te neck as he woke up today in 5AM . worse by take deep breathing and sob . he did not take any med or it . denies any hx of Cardiac or FMHX CAD .he states drink beer last drink was 4 days ago 9 beer . no other susbtance use any more - hx of smoking . denies any abd pain  n/v/D . denies any dizziness, sweating or lightheadedness

## 2025-05-07 NOTE — ED PROVIDER NOTE - NSFOLLOWUPCLINICS_GEN_ALL_ED_FT
[facility] at [facility] ([facility])  Cardiology  [address], [address]  [place] [address]  Phone: [phone]  Fax:   Follow Up Time: 4-6 Days

## 2025-05-07 NOTE — ED ADULT NURSE NOTE - OBJECTIVE STATEMENT
Patient And O x 3. Came to ED due to substernal chest pain and (L) arm pain. (+) tenderness to (L) upper trapezius and upper arm. Patient And O x 3. Came to ED due to substernal and (L) arm pain. Pain is reproduced with breathing. (+) tenderness to (L) upper trapezius and upper arm. Denies any cardiac history.

## 2025-05-07 NOTE — ED PROVIDER NOTE - NSFOLLOWUPINSTRUCTIONS_ED_ALL_ED_FT
please: Follow-up with the primary care doctor in 1 to 2 days and bring the result  Please: Follow-up with cardiology as well  Chest Pain    Chest pain can be caused by many different conditions which may or may not be dangerous. Causes include heartburn, lung infections, heart attack, blood clot in lungs, skin infections, strain or damage to muscle, cartilage, or bones, etc. In addition to a history and physical examination, an electrocardiogram (ECG) or other lab tests may have been performed to determine the cause of your chest pain. Follow up with your primary care provider or with a cardiologist as instructed.     SEEK IMMEDIATE MEDICAL CARE IF YOU HAVE ANY OF THE FOLLOWING SYMPTOMS: worsening chest pain, coughing up blood, unexplained back/neck/jaw pain, severe abdominal pain, dizziness or lightheadedness, fainting, shortness of breath, sweaty or clammy skin, vomiting, or racing heart beat. These symptoms may represent a serious problem that is an emergency. Do not wait to see if the symptoms will go away. Get medical help right away. Call 911 and do not drive yourself to the hospital. please: Follow-up with the primary care doctor in 1 to 2 days and bring the result  Please: Follow-up with cardiology as well   you can call / contact SBIRT for  alcohol  consumption at [phone]   Chest Pain    Chest pain can be caused by many different conditions which may or may not be dangerous. Causes include heartburn, lung infections, heart attack, blood clot in lungs, skin infections, strain or damage to muscle, cartilage, or bones, etc. In addition to a history and physical examination, an electrocardiogram (ECG) or other lab tests may have been performed to determine the cause of your chest pain. Follow up with your primary care provider or with a cardiologist as instructed.     SEEK IMMEDIATE MEDICAL CARE IF YOU HAVE ANY OF THE FOLLOWING SYMPTOMS: worsening chest pain, coughing up blood, unexplained back/neck/jaw pain, severe abdominal pain, dizziness or lightheadedness, fainting, shortness of breath, sweaty or clammy skin, vomiting, or racing heart beat. These symptoms may represent a serious problem that is an emergency. Do not wait to see if the symptoms will go away. Get medical help right away. Call 911 and do not drive yourself to the hospital.

## 2025-05-07 NOTE — ED PROVIDER NOTE - CLINICAL SUMMARY MEDICAL DECISION MAKING FREE TEXT BOX
29 y.o male PMH of alcohol abuse, scoliosis, hx of polysubstance abuse , presents in ER and  left side of the chest pain goes let arm and left side of te neck as he woke up today in 5AM . worse by take deep breathing and sob . he did not take any med or it . denies any hx of Cardiac or FMHX CAD .he states drink beer last drink was 4 days ago 9 beer . no other susbtance use any more - hx of smoking . denies any abd pain  n/v/D . denies any dizziness, sweating or lightheadedness  .  -Check further cardiac marker CBC CMP troponin, CPK, BNP, dimer, chest x-ray, pain control IV Toradol Robaxin and Maalox  - Thoracic Aortic Surveillance:    Background: Pt is being followed for  by the aortic surveillance clinic for a known known aortic root aneurysm in the presence of a previously repaired ascending aortic aneurysm (8/1/2018 w/ Dr. Valverde at [facility]).      Reminder call placed to patient to discuss need for follow up imaging.     Patient is due for an echocardiogram as a 1 year follow up in the month of May.     Order has been placed.      No in person visit is required at this time. We will call with results when available.     Tried contacting patient via phone. No answer and no option to leave a message. Letter will be sent.

## 2025-05-07 NOTE — ED PROVIDER NOTE - PHYSICAL EXAMINATION
Const: AOX3 nontoxic appearing, no apparent respiratory or physical distress. no tremor   HEENT: NC/AT. Moist mucous membranes.  Eyes: REDDY. EOMI  Neck: Soft and supple. Full ROM without pain.  Cardiac: Regular rate and regular rhythm. +S1/S2. No murmurs. Peripheral pulses 2+ and symmetric. No LE edema. left side of the chest wall muscle TTP   Resp: Speaking in full sentences. No evidence of respiratory distress. No wheezes, rales or rhonchi. No adventitious breath sounds   Abd: Soft, non-tender, non-distended. Normal bowel sounds in all 4 quadrants. No guarding or rebound.  Back: Spine midline and non-tender. No CVAT.  Skin: No rashes, abrasions or lacerations.  Neuro: Awake, alert & oriented x 3. Moves all extremities symmetrically.

## 2025-05-14 ENCOUNTER — APPOINTMENT (OUTPATIENT)
Dept: CARDIOLOGY | Facility: CLINIC | Age: 30
End: 2025-05-14
Payer: COMMERCIAL

## 2025-05-14 ENCOUNTER — NON-APPOINTMENT (OUTPATIENT)
Age: 30
End: 2025-05-14

## 2025-05-14 VITALS
OXYGEN SATURATION: 98 % | SYSTOLIC BLOOD PRESSURE: 112 MMHG | DIASTOLIC BLOOD PRESSURE: 70 MMHG | HEART RATE: 85 BPM | HEIGHT: 68 IN | WEIGHT: 168 LBS | BODY MASS INDEX: 25.46 KG/M2

## 2025-05-14 VITALS — DIASTOLIC BLOOD PRESSURE: 70 MMHG | SYSTOLIC BLOOD PRESSURE: 100 MMHG

## 2025-05-14 DIAGNOSIS — F10.10 ALCOHOL ABUSE, UNCOMPLICATED: ICD-10-CM

## 2025-05-14 DIAGNOSIS — R07.89 OTHER CHEST PAIN: ICD-10-CM

## 2025-05-14 PROCEDURE — 99204 OFFICE O/P NEW MOD 45 MIN: CPT

## 2025-05-14 PROCEDURE — 93000 ELECTROCARDIOGRAM COMPLETE: CPT

## 2025-05-14 PROCEDURE — G2211 COMPLEX E/M VISIT ADD ON: CPT | Mod: NC

## 2025-05-20 NOTE — ED STATDOCS - NS ED MD DISPO DISCHARGE
No protocol for requested medication.    Medication:    Name from pharmacy: Pregabalin 100 MG Oral Capsule         Will file in chart as: pregabalin (LYRICA) 100 MG capsule    Sig: TAKE 1 CAPSULE BY MOUTH IN THE MORNING AND 1 IN THE EVENING    Disp: 180 capsule    Refills: 0    Start: 5/20/2025    Class: Eprescribe    PDMP Review May Be Needed    Last ordered: 1 month ago (4/4/2025) by Daylin HERNANDEZ DO    Last refill: 11/19/2024    Rx #: [id number]       To be filled at: [facility] [address] [place] [address]     Last office visit date: 05/14/2025  Pharmacy: [facility] [address] [place] [address]    Order pended, routed to clinician for review.      Home

## 2025-06-03 ENCOUNTER — APPOINTMENT (OUTPATIENT)
Dept: CARDIOLOGY | Facility: CLINIC | Age: 30
End: 2025-06-03
Payer: COMMERCIAL

## 2025-06-03 PROCEDURE — 93306 TTE W/DOPPLER COMPLETE: CPT

## 2025-06-16 ENCOUNTER — APPOINTMENT (OUTPATIENT)
Dept: CARDIOLOGY | Facility: CLINIC | Age: 30
End: 2025-06-16
Payer: COMMERCIAL

## 2025-06-16 PROCEDURE — 93015 CV STRESS TEST SUPVJ I&R: CPT
